# Patient Record
Sex: MALE | Race: WHITE | NOT HISPANIC OR LATINO | URBAN - METROPOLITAN AREA
[De-identification: names, ages, dates, MRNs, and addresses within clinical notes are randomized per-mention and may not be internally consistent; named-entity substitution may affect disease eponyms.]

---
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
## 2021-01-08 ENCOUNTER — OFFICE VISIT - HEALTHEAST (OUTPATIENT)
Dept: FAMILY MEDICINE | Facility: CLINIC | Age: 21
End: 2021-01-08

## 2021-01-08 DIAGNOSIS — R05.9 COUGH: ICD-10-CM

## 2021-01-08 DIAGNOSIS — Z20.822 SUSPECTED COVID-19 VIRUS INFECTION: ICD-10-CM

## 2021-01-08 DIAGNOSIS — J02.9 SORE THROAT: ICD-10-CM

## 2021-01-08 DIAGNOSIS — R53.83 FATIGUE, UNSPECIFIED TYPE: ICD-10-CM

## 2021-01-08 DIAGNOSIS — R43.2 TASTE SENSE ALTERED: ICD-10-CM

## 2021-05-31 VITALS — BODY MASS INDEX: 28.32 KG/M2 | HEIGHT: 65 IN | WEIGHT: 170 LBS

## 2021-06-13 NOTE — PROGRESS NOTES
"11-18 YEAR WELL CHILD CHECK    Height:  5' 4.5\" (1.638 m)  Weight: 170 lb (77.1 kg)  Blood Pressure: 114/72  BMI: Body mass index is 28.73 kg/(m^2).  BSA: Body surface area is 1.87 meters squared.    SUBJECTIVE  This very pleasant 17-year-old male who is a senior at [employer].  He is considering going into the .  He is doing well in school.  He is the oldest of 2 children.  He plays baseball, has worked an outside job in the past, and his mother has no concerns.  Concerns: None, child doing well.     Family History   Problem Relation Age of Onset     Gestational diabetes Mother      No Medical Problems Father      Asthma Brother      Allergies Brother          Is child seen by dentist?     Yes, regular visits with family dentist    Nutrition:  No concerns    REVIEW OF SYSTEMS  Constitutional: Negative.  Negative for fever, activity change, appetite change and irritability.   HENT: Negative.  Negative for congestion, ear pain and voice change.    Eyes: Negative.  Negative for discharge and redness.   Respiratory: Negative.  Negative for apnea, choking and wheezing.    Cardiovascular: Negative.  Negative for cyanosis.   Gastrointestinal: Negative.  Negative for diarrhea, constipation, blood in stool and abdominal distention.   Endocrine: Negative.    Genitourinary: Negative.  Negative for decreased urine volume.   Musculoskeletal: Negative.  Negative for gait problem.   Skin: Negative.  Negative for color change and rash.   Allergic/Immunologic: Negative.  Negative for environmental allergies and food allergies.   Neurological: Negative.  Negative for seizures, facial asymmetry and weakness.   Hematological: Negative.  Does not bruise/bleed easily.   Psychiatric/Behavioral: Negative.  Negative for behavioral problems. The patient is not hyperactive.        PHYSICAL EXAM  General Appearance:   Alert, NAD   Eyes: Clear  Ears:  TM's pearly grey  Nose: Clear   Throat:  Clear   Neck:   Supple, no " significant adenopathy  Lungs:  Clear with equal air entry, no retractions or increased work of breathing  Cardiac: RRR without murmur  Abdomen:   Soft, nontender, no hepatosplenomegaly or mass palpable  Genitourinary: Deferred  Musculoskeletal:  Normal   Skin:  No rash or jaundice    ANTICIPATORY GUIDANCE    I have discussed age appropriate anticipatory guidance with the patient and all questions answered.  No concerns voiced.    ASSESSMENT/PLAN    Healthy 17-year-old.  Immunizations updated today.  We discussed importance of getting good grades and doing well in school, we discussed his future endeavors after high school including possible  service.  Encouraged continued participation in baseball.  All questions answered.        --    Garrett Brown M.D.

## 2021-06-14 NOTE — PROGRESS NOTES
"Heath Quevedo is a 20 y.o. male who is being evaluated via a billable telephone visit.      What phone number would you like to be contacted at? [phone].  How would you like to obtain your AVS? N/A.   Assessment & Plan       Overall patient is doing okay.  He and mother asked if he should be tested for COVID-19.  I said that given his symptoms it certainly would be a good idea to know if he has it.  Patient then started to have doubts about getting tested for Covid.  When asked why he says that he does not want to be \"a part of that\" and cannot get into anything more specific.  When asked what I can do today to help him or what he is looking for, it sounds like he just wants to know if it is my recommendation he get tested which it is.  They expressed understanding and thanked me for my time.      Taste sense altered    Fatigue, unspecified type    Sore throat    Cough    Suspected COVID-19 virus infection        9 minutes spent on the date of the encounter doing chart review, patient visit and discussion with family        No follow-ups on file.    Omid Badillo CNP  [facility]     Heath Quevedo is 20 y.o. and presents to clinic today for the following health issues   HPI     New patient presents today with the assistance of his mother with concerns for illness including sore throat, mild cough, fatigue, chills, body aches.  Sick exposure include others in his family.  Patient says that he does not ever really leave the house so he does not think that he could have gotten this from anybody else.  No severe cough or shortness of breath.  No rash.        Review of Systems  Positive for: Mild cough, fatigue, sore throat, change in taste.      Objective       Vitals:  No vitals were obtained today due to virtual visit.    Physical Exam  Unable to complete due to this being a phone visit        Phone call duration: 7 minutes    Omid Badillo CNP    "

## 2021-12-06 ENCOUNTER — NURSE TRIAGE (OUTPATIENT)
Dept: NURSING | Facility: CLINIC | Age: 21
End: 2021-12-06
Payer: COMMERCIAL

## 2021-12-06 NOTE — TELEPHONE ENCOUNTER
"Patient calling  He is reporting the following symptoms;  Achy body  Slight headache  Cough slight  Not feeling well.for about 1 week.    Asking where to be tested.for COVIDCOVID 19 Nurse Triage Plan/Patient Instructions    Please be aware that novel coronavirus (COVID-19) may be circulating in the community. If you develop symptoms such as fever, cough, or SOB or if you have concerns about the presence of another infection including coronavirus (COVID-19), please contact your health care provider or visit [email or web address].     Disposition/Instructions    Home care recommended. Follow home care protocol based instructions.  Virtual Visit with provider recommended. Reference Visit Selection Guide.  Additional COVID19 information to add for patients.   How can I protect others?  If you have symptoms (fever, cough, body aches or trouble breathing): Stay home and away from others (self-isolate) until:    At least 10 days have passed since your symptoms started, And     You ve had no fever--and no medicine that reduces fever--for 1 full day (24 hours), And      Your other symptoms have resolved (gotten better).     If you don t have symptoms, but a test showed that you have COVID-19 (you tested positive):    Stay home and away from others (self-isolate). Follow the tips under \"How do I self-isolate?\" below for 10 days (20 days if you have a weak immune system).    You don't need to be retested for COVID-19 before going back to school or work. As long as you're fever-free and feeling better, you can go back to school, work and other activities after waiting the 10 or 20 days.     How do I self-isolate?    Stay in your own room, even for meals. Use your own bathroom if you can.     Stay away from others in your home. No hugging, kissing or shaking hands. No visitors.    Don t go to work, school or anywhere else.     Clean  high touch  surfaces often (doorknobs, counters, handles, etc.). Use a household cleaning " spray or wipes. You ll find a full list on the EPA website:  www.epa.gov/pesticide-registration/list-n-disinfectants-use-against-sars-cov-2.    Cover your mouth and nose with a mask, tissue or washcloth to avoid spreading germs.    Wash your hands and face often. Use soap and water.    Caregivers in these groups are at risk for severe illness due to COVID-19:  o People 65 years and older  o People who live in a nursing home or long-term care facility  o People with chronic disease (lung, heart, cancer, diabetes, kidney, liver, immunologic)  o People who have a weakened immune system, including those who:  - Are in cancer treatment  - Take medicine that weakens the immune system, such as corticosteroids  - Had a bone marrow or organ transplant  - Have an immune deficiency  - Have poorly controlled HIV or AIDS  - Are obese (body mass index of 40 or higher)  - Smoke regularly    Caregivers should wear gloves while washing dishes, handling laundry and cleaning bedrooms and bathrooms.    Use caution when washing and drying laundry: Don t shake dirty laundry, and use the warmest water setting that you can.    For more tips, go to www.cdc.gov/coronavirus/2019-ncov/downloads/10Things.pdf.    How can I take care of myself?  1. Get lots of rest. Drink extra fluids (unless a doctor has told you not to).     2. Take Tylenol (acetaminophen) for fever or pain. If you have liver or kidney problems, ask your family doctor if it s okay to take Tylenol.     Adults can take either:     650 mg (two 325 mg pills) every 4 to 6 hours, or     1,000 mg (two 500 mg pills) every 8 hours as needed.     Note: Don t take more than 3,000 mg in one day.   Acetaminophen is found in many medicines (both prescribed and over-the-counter medicines). Read all labels to be sure you don t take too much.     For children, check the Tylenol bottle for the right dose. The dose is based on the child s age or weight.    3. If you have other health problems  (like cancer, heart failure, an organ transplant or severe kidney disease): Call your specialty clinic if you don t feel better in the next 2 days.    4. Know when to call 911: Emergency warning signs include:    Trouble breathing or shortness of breath    Pain or pressure in the chest that doesn t go away    Feeling confused like you haven t felt before, or not being able to wake up    Bluish-colored lips or face    What are the symptoms of COVID-19?     The most common symptoms are cough, fever and trouble breathing.     Less common symptoms include body aches, chills, diarrhea (loose, watery poops), fatigue (feeling very tired), headache, runny nose, sore throat and loss of smell.    COVID-19 can cause severe coughing (bronchitis) and lung infection (pneumonia).    How does it spread?     The virus may spread when a person coughs or sneezes into the air. The virus can travel about 6 feet this way, and it can live on surfaces.      Common  (household disinfectants) will kill the virus.    Who is at risk?  Anyone can catch COVID-19 if they re around someone who has the virus.    How can others protect themselves?     Stay away from people who have COVID-19 (or symptoms of COVID-19).    Wash hands often with soap and water. Or, use hand  with at least 60% alcohol.    Avoid touching the eyes, nose or mouth.     Wear a face mask when you go out in public, when sick or when caring for a sick person.    Where can I get more information?    [facility]: About COVID-19: [email or web address]    CDC: What to Do If You re Sick: www.cdc.gov/coronavirus/2019-ncov/about/steps-when-sick.html    CDC: Ending Home Isolation: www.cdc.gov/coronavirus/2019-ncov/hcp/disposition-in-home-patients.html     CDC: Caring for Someone: www.cdc.gov/coronavirus/2019-ncov/if-you-are-sick/care-for-someone.html     MD: Interim Guidance for Hospital Discharge to Home:  www.[facility]./diseases/coronavirus/hcp/hospdischarge.pdf    [facility] clinical trials (COVID-19 research studies): clinicalaffairs.[facility]-clinical-trials     Below are the COVID-19 hotlines at the [place] [facility] ([facility]). Interpreters are available.   o For health questions: Call 325-478-8894 or 1-729.869.5807 (7 a.m. to 7 p.m.)  o For questions about schools and childcare: Call 243-231-7618 or 1-370.254.9376 (7 a.m. to 7 p.m.)          Thank you for taking steps to prevent the spread of this virus.  o Limit your contact with others.  o Wear a simple mask to cover your cough.  o Wash your hands well and often.    [facility]: About COVID-19: [email or web address]    CDC: What to Do If You're Sick: www.cdc.gov/coronavirus/2019-ncov/about/steps-when-sick.html    CDC: Ending Home Isolation: www.cdc.gov/coronavirus/2019-ncov/hcp/disposition-in-home-patients.html     CDC: Caring for Someone: www.cdc.gov/coronavirus/2019-ncov/if-you-are-sick/care-for-someone.html     [facility]: Interim Guidance for Hospital Discharge to Home: www.[facility]./diseases/coronavirus/hcp/hospdischarge.pdf    [facility] clinical trials (COVID-19 research studies): clinicalaffairs.[facility]-clinical-trials     Below are the COVID-19 hotlines at the [place] [facility] ([facility]). Interpreters are available.   o For health questions: Call 525-254-2613 or 1-998.831.5815 (7 a.m. to 7 p.m.)  o For questions about schools and childcare: Call 818-661-1028 or 1-922.885.4571 (7 a.m. to 7 p.m.)                     Advised to get tested in his community, and also advised to make E-visit with [facility].    Asiya B. Ablin, RN  [facility] Triage/refill nurse        Reason for Disposition    COVID-19 Testing, questions about    COVID-19 Prevention and Healthy Living, questions about    Additional Information    Negative: COVID-19 Home Isolation, questions about     Negative: [1] COVID-19 diagnosed AND [2] has mild nausea, vomiting or diarrhea    Negative: [1] COVID-19 diagnosed by doctor (or NP/PA) AND [2] mild symptoms (e.g., cough, fever, others) AND [3] no complications or SOB    Negative: [1] COVID-19 diagnosed by positive lab test AND [2] mild symptoms (e.g., cough, fever, others) AND [3] no complications or SOB    Negative: [1] COVID-19 diagnosed by positive lab test AND [2] NO symptoms (e.g., cough, fever, others)    Negative: Cough present > 3 weeks    Negative: [1] COVID-19 infection suspected by caller or triager AND [2] mild symptoms (cough, fever, or others) AND [3] negative COVID-19 rapid test    Negative: Fever present > 3 days (72 hours)    Negative: [1] Fever returns after gone for over 24 hours AND [2] symptoms worse or not improved    Negative: [1] Continuous (nonstop) coughing interferes with work or school AND [2] no improvement using cough treatment per protocol    Negative: HIGH RISK for severe COVID complications (e.g., age > 64 years, obesity with BMI > 25, pregnant, chronic lung disease or other chronic medical condition)  (Exception: Already seen by PCP and no new or worsening symptoms.)    Negative: [1] HIGH RISK patient AND [2] influenza is widespread in the community AND [3] ONE OR MORE respiratory symptoms: cough, sore throat, runny or stuffy nose    Negative: [1] HIGH RISK patient AND [2] influenza exposure within the last 7 days AND [3] ONE OR MORE respiratory symptoms: cough, sore throat, runny or stuffy nose    Negative: Fever > 103 F (39.4 C)    Negative: [1] Fever > 101 F (38.3 C) AND [2] age > 60 years    Negative: [1] Fever > 100.0 F (37.8 C) AND [2] bedridden (e.g., nursing home patient, CVA, chronic illness, recovering from surgery)    Negative: MILD difficulty breathing (e.g., minimal/no SOB at rest, SOB with walking, pulse <100)    Negative: Chest pain or pressure    Negative: Patient sounds very sick or weak to the triager     Negative: SEVERE or constant chest pain or pressure (Exception: mild central chest pain, present only when coughing)    Negative: MODERATE difficulty breathing (e.g., speaks in phrases, SOB even at rest, pulse 100-120)    Negative: [1] Headache AND [2] stiff neck (can't touch chin to chest)    Negative: [1] COVID-19 exposure AND [2] no symptoms    Negative: COVID-19 vaccine reaction suspected (e.g., fever, headache, muscle aches) occurring 1 to 3 days after getting vaccine    Negative: COVID-19 vaccine, questions about    Negative: [1] Lives with someone known to have influenza (flu test positive) AND [2] flu-like symptoms (e.g., cough, runny nose, sore throat, SOB; with or without fever)    Negative: [1] Adult with possible COVID-19 symptoms AND [2] triager concerned about severity of symptoms or other causes    Negative: COVID-19 and breastfeeding, questions about    Negative: SEVERE difficulty breathing (e.g., struggling for each breath, speaks in single words)    Negative: Difficult to awaken or acting confused (e.g., disoriented, slurred speech)    Negative: Bluish (or gray) lips or face now    Negative: Shock suspected (e.g., cold/pale/clammy skin, too weak to stand, low BP, rapid pulse)    Negative: Sounds like a life-threatening emergency to the triager    Protocols used: CORONAVIRUS (COVID-19) DIAGNOSED OR EXDOVQUVR-D-MW 8.25.2021

## 2021-12-07 ENCOUNTER — VIRTUAL VISIT (OUTPATIENT)
Dept: FAMILY MEDICINE | Facility: CLINIC | Age: 21
End: 2021-12-07
Payer: COMMERCIAL

## 2021-12-07 DIAGNOSIS — R05.9 COUGH: Primary | ICD-10-CM

## 2021-12-07 PROCEDURE — 99213 OFFICE O/P EST LOW 20 MIN: CPT | Mod: 95 | Performed by: PEDIATRICS

## 2021-12-07 NOTE — PROGRESS NOTES
Heath is a 21 year old who is being evaluated via a billable telephone visit.      What phone number would you like to be contacted at?   How would you like to obtain your AVS? Mail a copy    Assessment & Plan     Cough  Education and supportive cares discussed  Warning signs and reasons to return discussed  - Symptomatic COVID-19 Virus (Coronavirus) by PCR                   Return in about 5 days (around 12/12/2021) for if not improving.    Olga Sue MD  [facility] [place] [facility]    Kelle Joe is a 21 year old who presents for the following health issues     HPI     Acute Illness  Acute illness concerns: COVID symptoms  Onset/Duration: 9 days, improving over past 2 days  Symptoms:  Fever: unsure, not measured  Chills/Sweats: YES  Headache (location?): YES  Sinus Pressure: no  Conjunctivitis:  no  Ear Pain: no  Rhinorrhea: YES  Congestion: no  Sore Throat: no  Cough: YES - last week, slight  Wheeze: no  Decreased Appetite: no  Nausea: no  Vomiting: no  Diarrhea: no  Dysuria/Freq.: no  Dysuria or Hematuria: no  Fatigue/Achiness: YES  Sick/Strep Exposure: no  Therapies tried and outcome: Tylenol helps      Review of Systems   Constitutional, HEENT, cardiovascular, pulmonary, gi and gu systems are negative, except as otherwise noted.      Objective           Vitals:  No vitals were obtained today due to virtual visit.    Physical Exam   healthy, alert and no distress  PSYCH: Alert and oriented times 3; coherent speech, normal   rate and volume, able to articulate logical thoughts, able   to abstract reason, no tangential thoughts, no hallucinations   or delusions  His affect is normal  RESP: No cough, no audible wheezing, able to talk in full sentences  Remainder of exam unable to be completed due to telephone visits                Phone call duration: 5 minutes

## 2021-12-08 ENCOUNTER — LAB (OUTPATIENT)
Dept: URGENT CARE | Facility: URGENT CARE | Age: 21
End: 2021-12-08
Attending: PEDIATRICS
Payer: COMMERCIAL

## 2021-12-08 DIAGNOSIS — R05.9 COUGH: ICD-10-CM

## 2021-12-08 LAB — SARS-COV-2 RNA RESP QL NAA+PROBE: NEGATIVE

## 2021-12-08 PROCEDURE — U0003 INFECTIOUS AGENT DETECTION BY NUCLEIC ACID (DNA OR RNA); SEVERE ACUTE RESPIRATORY SYNDROME CORONAVIRUS 2 (SARS-COV-2) (CORONAVIRUS DISEASE [COVID-19]), AMPLIFIED PROBE TECHNIQUE, MAKING USE OF HIGH THROUGHPUT TECHNOLOGIES AS DESCRIBED BY CMS-2020-01-R: HCPCS

## 2021-12-08 PROCEDURE — U0005 INFEC AGEN DETEC AMPLI PROBE: HCPCS

## 2021-12-08 NOTE — LETTER
December 9, 2021      Heath Christopheron  [address]   [place] [address]        Dear ,    We are writing to inform you of your test results.    Your COVID test is negative.  Please let me know if your cough is not improving.     Please don't hesitate to call me if you have any questions.     Resulted Orders   Symptomatic COVID-19 Virus (Coronavirus) by PCR Nose   Result Value Ref Range    SARS CoV2 PCR Negative Negative, Testing sent to reference lab. Results will be returned via unsolicited result      Comment:      NEGATIVE: SARS-CoV-2 (COVID-19) RNA not detected, presumed negative.    Narrative    Testing was performed using the [employer]pt[employer] SARS-CoV-2 Assay on the  [employer] Instrument System. Additional information about this  Emergency Use Authorization (EUA) assay can be found via the Lab  Guide. This test should be ordered for the detection of SARS-CoV-2 in  individuals who meet SARS-CoV-2 clinical and/or epidemiological  criteria. Test performance is unknown in asymptomatic patients. This  test is for in vitro diagnostic use under the FDA EUA for  laboratories certified under CLIA to perform high complexity testing.  This test has not been FDA cleared or approved. A negative result  does not rule out the presence of PCR inhibitors in the specimen or  target RNA in concentration below the limit of detection for the  assay. The possibility of a false negative should be considered if  the patient's recent exposure or clinical presentation suggests  COVID-19. This test was validated by the [facility]. This laboratory is certified under  the Clinical Laboratory Improvement Amendments of 1988 (CLIA-88) as  qualified to perform high complexity laboratory testing.       If you have any questions or concerns, please call the clinic at the number listed above.       Sincerely,      Olga Sue MD

## 2021-12-09 NOTE — RESULT ENCOUNTER NOTE
Dear Heath Quevedo,    Your COVID test is negative.  Please let me know if your cough is not improving.    Please don't hesitate to call me if you have any questions.    Sincerely,  Olga Sue M.D.  [phone]

## 2024-07-16 ENCOUNTER — APPOINTMENT (OUTPATIENT)
Dept: CT IMAGING | Facility: CLINIC | Age: 24
End: 2024-07-16
Attending: EMERGENCY MEDICINE
Payer: COMMERCIAL

## 2024-07-16 ENCOUNTER — HOSPITAL ENCOUNTER (OUTPATIENT)
Facility: HOSPITAL | Age: 24
Setting detail: OBSERVATION
Discharge: HOME | End: 2024-07-17
Attending: INTERNAL MEDICINE | Admitting: INTERNAL MEDICINE
Payer: COMMERCIAL

## 2024-07-16 ENCOUNTER — HOSPITAL ENCOUNTER (EMERGENCY)
Facility: CLINIC | Age: 24
Discharge: OTHER INSTITUTION | End: 2024-07-16
Attending: EMERGENCY MEDICINE | Admitting: EMERGENCY MEDICINE
Payer: COMMERCIAL

## 2024-07-16 ENCOUNTER — OFFICE VISIT (OUTPATIENT)
Dept: URGENT CARE | Facility: URGENT CARE | Age: 24
End: 2024-07-16
Payer: COMMERCIAL

## 2024-07-16 VITALS
BODY MASS INDEX: 30.82 KG/M2 | TEMPERATURE: 99.6 F | WEIGHT: 185 LBS | HEIGHT: 65 IN | RESPIRATION RATE: 16 BRPM | SYSTOLIC BLOOD PRESSURE: 129 MMHG | OXYGEN SATURATION: 98 % | HEART RATE: 99 BPM | DIASTOLIC BLOOD PRESSURE: 84 MMHG

## 2024-07-16 VITALS
OXYGEN SATURATION: 98 % | RESPIRATION RATE: 21 BRPM | WEIGHT: 186 LBS | SYSTOLIC BLOOD PRESSURE: 119 MMHG | DIASTOLIC BLOOD PRESSURE: 65 MMHG | BODY MASS INDEX: 30.99 KG/M2 | HEIGHT: 65 IN | HEART RATE: 71 BPM | TEMPERATURE: 98.9 F

## 2024-07-16 DIAGNOSIS — K62.89 PROCTITIS: Primary | ICD-10-CM

## 2024-07-16 DIAGNOSIS — M25.551 BILATERAL HIP PAIN: Primary | ICD-10-CM

## 2024-07-16 DIAGNOSIS — K61.2 ABSCESS OF ANAL OR RECTAL REGION: ICD-10-CM

## 2024-07-16 DIAGNOSIS — K62.89 PROCTITIS: ICD-10-CM

## 2024-07-16 DIAGNOSIS — K62.89 RECTAL PAIN: ICD-10-CM

## 2024-07-16 DIAGNOSIS — M25.552 BILATERAL HIP PAIN: Primary | ICD-10-CM

## 2024-07-16 LAB
ALBUMIN SERPL BCG-MCNC: 4.7 G/DL (ref 3.5–5.2)
ALBUMIN UR-MCNC: 100 MG/DL
ALP SERPL-CCNC: 77 U/L (ref 40–150)
ALT SERPL W P-5'-P-CCNC: 11 U/L (ref 0–70)
ANION GAP SERPL CALCULATED.3IONS-SCNC: 15 MMOL/L (ref 7–15)
APPEARANCE UR: CLEAR
AST SERPL W P-5'-P-CCNC: 30 U/L (ref 0–45)
BACTERIA #/AREA URNS HPF: ABNORMAL /HPF
BASE EXCESS BLDV CALC-SCNC: 0.6 MMOL/L (ref -3–3)
BASOPHILS # BLD AUTO: 0 10E3/UL (ref 0–0.2)
BASOPHILS NFR BLD AUTO: 0 %
BILIRUB SERPL-MCNC: 0.5 MG/DL
BILIRUB UR QL STRIP: ABNORMAL
BUN SERPL-MCNC: 10 MG/DL (ref 6–20)
CALCIUM SERPL-MCNC: 9.2 MG/DL (ref 8.8–10.4)
CHLORIDE SERPL-SCNC: 97 MMOL/L (ref 98–107)
COLOR UR AUTO: YELLOW
CREAT SERPL-MCNC: 1.03 MG/DL (ref 0.67–1.17)
CRP SERPL-MCNC: 85 MG/L
EGFRCR SERPLBLD CKD-EPI 2021: >90 ML/MIN/1.73M2
EOSINOPHIL # BLD AUTO: 0 10E3/UL (ref 0–0.7)
EOSINOPHIL NFR BLD AUTO: 0 %
ERYTHROCYTE [DISTWIDTH] IN BLOOD BY AUTOMATED COUNT: 13.1 % (ref 10–15)
ERYTHROCYTE [SEDIMENTATION RATE] IN BLOOD BY WESTERGREN METHOD: 14 MM/HR (ref 0–15)
GLUCOSE SERPL-MCNC: 101 MG/DL (ref 70–99)
GLUCOSE UR STRIP-MCNC: NEGATIVE MG/DL
HCO3 BLDV-SCNC: 26 MMOL/L (ref 21–28)
HCO3 SERPL-SCNC: 23 MMOL/L (ref 22–29)
HCT VFR BLD AUTO: 45.7 % (ref 40–53)
HGB BLD-MCNC: 15.6 G/DL (ref 13.3–17.7)
HGB UR QL STRIP: ABNORMAL
HOLD SPECIMEN: NORMAL
IMM GRANULOCYTES # BLD: 0 10E3/UL
IMM GRANULOCYTES NFR BLD: 0 %
KETONES UR STRIP-MCNC: NEGATIVE MG/DL
LACTATE SERPL-SCNC: 1.1 MMOL/L (ref 0.7–2)
LEUKOCYTE ESTERASE UR QL STRIP: NEGATIVE
LYMPHOCYTES # BLD AUTO: 1.1 10E3/UL (ref 0.8–5.3)
LYMPHOCYTES NFR BLD AUTO: 14 %
MCH RBC QN AUTO: 30.8 PG (ref 26.5–33)
MCHC RBC AUTO-ENTMCNC: 34.1 G/DL (ref 31.5–36.5)
MCV RBC AUTO: 90 FL (ref 78–100)
MONOCYTES # BLD AUTO: 0.7 10E3/UL (ref 0–1.3)
MONOCYTES NFR BLD AUTO: 9 %
MUCOUS THREADS #/AREA URNS LPF: PRESENT /LPF
NEUTROPHILS # BLD AUTO: 5.6 10E3/UL (ref 1.6–8.3)
NEUTROPHILS NFR BLD AUTO: 76 %
NITRATE UR QL: NEGATIVE
NRBC # BLD AUTO: 0 10E3/UL
NRBC BLD AUTO-RTO: 0 /100
O2/TOTAL GAS SETTING VFR VENT: 21 %
OXYHGB MFR BLDV: 73 % (ref 70–75)
PCO2 BLDV: 43 MM HG (ref 40–50)
PH BLDV: 7.38 [PH] (ref 7.32–7.43)
PH UR STRIP: 6 [PH] (ref 5–7)
PLATELET # BLD AUTO: 199 10E3/UL (ref 150–450)
PO2 BLDV: 38 MM HG (ref 25–47)
POTASSIUM SERPL-SCNC: 3.9 MMOL/L (ref 3.4–5.3)
PROT SERPL-MCNC: 8.3 G/DL (ref 6.4–8.3)
RBC # BLD AUTO: 5.06 10E6/UL (ref 4.4–5.9)
RBC #/AREA URNS AUTO: ABNORMAL /HPF
SAO2 % BLDV: 74.4 % (ref 70–75)
SODIUM SERPL-SCNC: 135 MMOL/L (ref 135–145)
SP GR UR STRIP: >=1.03 (ref 1–1.03)
SQUAMOUS #/AREA URNS AUTO: ABNORMAL /LPF
UROBILINOGEN UR STRIP-ACNC: 1 E.U./DL
WBC # BLD AUTO: 7.4 10E3/UL (ref 4–11)
WBC #/AREA URNS AUTO: ABNORMAL /HPF

## 2024-07-16 PROCEDURE — 36415 COLL VENOUS BLD VENIPUNCTURE: CPT | Performed by: EMERGENCY MEDICINE

## 2024-07-16 PROCEDURE — 250N000011 HC RX IP 250 OP 636: Performed by: EMERGENCY MEDICINE

## 2024-07-16 PROCEDURE — 80053 COMPREHEN METABOLIC PANEL: CPT | Performed by: EMERGENCY MEDICINE

## 2024-07-16 PROCEDURE — 85652 RBC SED RATE AUTOMATED: CPT | Performed by: EMERGENCY MEDICINE

## 2024-07-16 PROCEDURE — 82805 BLOOD GASES W/O2 SATURATION: CPT | Performed by: EMERGENCY MEDICINE

## 2024-07-16 PROCEDURE — 258N000003 HC RX IP 258 OP 636: Performed by: EMERGENCY MEDICINE

## 2024-07-16 PROCEDURE — 250N000013 HC RX MED GY IP 250 OP 250 PS 637: Performed by: EMERGENCY MEDICINE

## 2024-07-16 PROCEDURE — 96366 THER/PROPH/DIAG IV INF ADDON: CPT

## 2024-07-16 PROCEDURE — 74177 CT ABD & PELVIS W/CONTRAST: CPT

## 2024-07-16 PROCEDURE — 81001 URINALYSIS AUTO W/SCOPE: CPT | Performed by: NURSE PRACTITIONER

## 2024-07-16 PROCEDURE — 96374 THER/PROPH/DIAG INJ IV PUSH: CPT | Mod: 59

## 2024-07-16 PROCEDURE — 87040 BLOOD CULTURE FOR BACTERIA: CPT | Performed by: EMERGENCY MEDICINE

## 2024-07-16 PROCEDURE — 96365 THER/PROPH/DIAG IV INF INIT: CPT

## 2024-07-16 PROCEDURE — 99207 PR INPT ADMISSION FROM CLINIC: CPT | Performed by: NURSE PRACTITIONER

## 2024-07-16 PROCEDURE — 120N000001 HC R&B MED SURG/OB

## 2024-07-16 PROCEDURE — 83605 ASSAY OF LACTIC ACID: CPT | Performed by: EMERGENCY MEDICINE

## 2024-07-16 PROCEDURE — 250N000011 HC RX IP 250 OP 636: Performed by: INTERNAL MEDICINE

## 2024-07-16 PROCEDURE — 258N000003 HC RX IP 258 OP 636: Performed by: INTERNAL MEDICINE

## 2024-07-16 PROCEDURE — 96361 HYDRATE IV INFUSION ADD-ON: CPT

## 2024-07-16 PROCEDURE — 86140 C-REACTIVE PROTEIN: CPT | Performed by: EMERGENCY MEDICINE

## 2024-07-16 PROCEDURE — 85025 COMPLETE CBC W/AUTO DIFF WBC: CPT | Performed by: EMERGENCY MEDICINE

## 2024-07-16 PROCEDURE — 99285 EMERGENCY DEPT VISIT HI MDM: CPT | Mod: 25

## 2024-07-16 PROCEDURE — 96368 THER/DIAG CONCURRENT INF: CPT

## 2024-07-16 PROCEDURE — 99222 1ST HOSP IP/OBS MODERATE 55: CPT | Performed by: INTERNAL MEDICINE

## 2024-07-16 PROCEDURE — 96375 TX/PRO/DX INJ NEW DRUG ADDON: CPT

## 2024-07-16 PROCEDURE — 96367 TX/PROPH/DG ADDL SEQ IV INF: CPT

## 2024-07-16 RX ORDER — IOPAMIDOL 755 MG/ML
90 INJECTION, SOLUTION INTRAVASCULAR ONCE
Status: COMPLETED | OUTPATIENT
Start: 2024-07-16 | End: 2024-07-16

## 2024-07-16 RX ORDER — HYDROMORPHONE HYDROCHLORIDE 1 MG/ML
0.3 INJECTION, SOLUTION INTRAMUSCULAR; INTRAVENOUS; SUBCUTANEOUS EVERY 6 HOURS PRN
Status: DISCONTINUED | OUTPATIENT
Start: 2024-07-16 | End: 2024-07-17 | Stop reason: HOSPADM

## 2024-07-16 RX ORDER — ONDANSETRON 4 MG/1
4 TABLET, ORALLY DISINTEGRATING ORAL EVERY 6 HOURS PRN
Status: DISCONTINUED | OUTPATIENT
Start: 2024-07-16 | End: 2024-07-17 | Stop reason: HOSPADM

## 2024-07-16 RX ORDER — PIPERACILLIN SODIUM, TAZOBACTAM SODIUM 3; .375 G/15ML; G/15ML
3.38 INJECTION, POWDER, LYOPHILIZED, FOR SOLUTION INTRAVENOUS EVERY 8 HOURS
Status: DISCONTINUED | OUTPATIENT
Start: 2024-07-16 | End: 2024-07-16

## 2024-07-16 RX ORDER — ACETAMINOPHEN 650 MG/1
650 SUPPOSITORY RECTAL EVERY 4 HOURS PRN
Status: DISCONTINUED | OUTPATIENT
Start: 2024-07-16 | End: 2024-07-17 | Stop reason: HOSPADM

## 2024-07-16 RX ORDER — PIPERACILLIN SODIUM, TAZOBACTAM SODIUM 3; .375 G/15ML; G/15ML
3.38 INJECTION, POWDER, LYOPHILIZED, FOR SOLUTION INTRAVENOUS EVERY 8 HOURS
Status: DISCONTINUED | OUTPATIENT
Start: 2024-07-17 | End: 2024-07-17 | Stop reason: HOSPADM

## 2024-07-16 RX ORDER — NALOXONE HYDROCHLORIDE 0.4 MG/ML
0.4 INJECTION, SOLUTION INTRAMUSCULAR; INTRAVENOUS; SUBCUTANEOUS
Status: DISCONTINUED | OUTPATIENT
Start: 2024-07-16 | End: 2024-07-17 | Stop reason: HOSPADM

## 2024-07-16 RX ORDER — METRONIDAZOLE 500 MG/100ML
500 INJECTION, SOLUTION INTRAVENOUS ONCE
Status: DISCONTINUED | OUTPATIENT
Start: 2024-07-16 | End: 2024-07-16 | Stop reason: HOSPADM

## 2024-07-16 RX ORDER — NALOXONE HYDROCHLORIDE 0.4 MG/ML
0.2 INJECTION, SOLUTION INTRAMUSCULAR; INTRAVENOUS; SUBCUTANEOUS
Status: DISCONTINUED | OUTPATIENT
Start: 2024-07-16 | End: 2024-07-17 | Stop reason: HOSPADM

## 2024-07-16 RX ORDER — AMOXICILLIN 250 MG
1 CAPSULE ORAL 2 TIMES DAILY PRN
Status: DISCONTINUED | OUTPATIENT
Start: 2024-07-16 | End: 2024-07-17 | Stop reason: HOSPADM

## 2024-07-16 RX ORDER — CALCIUM CARBONATE 500 MG/1
1000 TABLET, CHEWABLE ORAL 4 TIMES DAILY PRN
Status: DISCONTINUED | OUTPATIENT
Start: 2024-07-16 | End: 2024-07-17 | Stop reason: HOSPADM

## 2024-07-16 RX ORDER — LIDOCAINE 40 MG/G
CREAM TOPICAL
Status: DISCONTINUED | OUTPATIENT
Start: 2024-07-16 | End: 2024-07-17 | Stop reason: HOSPADM

## 2024-07-16 RX ORDER — ACETAMINOPHEN 325 MG/1
650 TABLET ORAL EVERY 4 HOURS PRN
Status: DISCONTINUED | OUTPATIENT
Start: 2024-07-16 | End: 2024-07-17 | Stop reason: HOSPADM

## 2024-07-16 RX ORDER — PIPERACILLIN SODIUM, TAZOBACTAM SODIUM 3; .375 G/15ML; G/15ML
3.38 INJECTION, POWDER, LYOPHILIZED, FOR SOLUTION INTRAVENOUS ONCE
Status: DISCONTINUED | OUTPATIENT
Start: 2024-07-16 | End: 2024-07-16

## 2024-07-16 RX ORDER — CIPROFLOXACIN 2 MG/ML
400 INJECTION, SOLUTION INTRAVENOUS EVERY 12 HOURS
Status: DISCONTINUED | OUTPATIENT
Start: 2024-07-17 | End: 2024-07-17 | Stop reason: HOSPADM

## 2024-07-16 RX ORDER — AMOXICILLIN 250 MG
2 CAPSULE ORAL 2 TIMES DAILY PRN
Status: DISCONTINUED | OUTPATIENT
Start: 2024-07-16 | End: 2024-07-17 | Stop reason: HOSPADM

## 2024-07-16 RX ORDER — SODIUM CHLORIDE 9 MG/ML
INJECTION, SOLUTION INTRAVENOUS CONTINUOUS
Status: DISCONTINUED | OUTPATIENT
Start: 2024-07-16 | End: 2024-07-17

## 2024-07-16 RX ORDER — METRONIDAZOLE 500 MG/100ML
500 INJECTION, SOLUTION INTRAVENOUS EVERY 12 HOURS
Status: DISCONTINUED | OUTPATIENT
Start: 2024-07-17 | End: 2024-07-17 | Stop reason: HOSPADM

## 2024-07-16 RX ORDER — ONDANSETRON 2 MG/ML
4 INJECTION INTRAMUSCULAR; INTRAVENOUS EVERY 6 HOURS PRN
Status: DISCONTINUED | OUTPATIENT
Start: 2024-07-16 | End: 2024-07-17 | Stop reason: HOSPADM

## 2024-07-16 RX ORDER — CIPROFLOXACIN 2 MG/ML
400 INJECTION, SOLUTION INTRAVENOUS ONCE
Status: COMPLETED | OUTPATIENT
Start: 2024-07-16 | End: 2024-07-16

## 2024-07-16 RX ORDER — IBUPROFEN 600 MG/1
600 TABLET, FILM COATED ORAL ONCE
Status: COMPLETED | OUTPATIENT
Start: 2024-07-16 | End: 2024-07-16

## 2024-07-16 RX ADMIN — IOPAMIDOL 90 ML: 755 INJECTION, SOLUTION INTRAVENOUS at 15:47

## 2024-07-16 RX ADMIN — SODIUM CHLORIDE: 9 INJECTION, SOLUTION INTRAVENOUS at 21:55

## 2024-07-16 RX ADMIN — METRONIDAZOLE 500 MG: 500 INJECTION, SOLUTION INTRAVENOUS at 19:25

## 2024-07-16 RX ADMIN — PANTOPRAZOLE SODIUM 40 MG: 40 INJECTION, POWDER, FOR SOLUTION INTRAVENOUS at 21:03

## 2024-07-16 RX ADMIN — CIPROFLOXACIN 400 MG: 400 INJECTION, SOLUTION INTRAVENOUS at 17:32

## 2024-07-16 RX ADMIN — SODIUM CHLORIDE, POTASSIUM CHLORIDE, SODIUM LACTATE AND CALCIUM CHLORIDE 2532 ML: 600; 310; 30; 20 INJECTION, SOLUTION INTRAVENOUS at 14:57

## 2024-07-16 RX ADMIN — IBUPROFEN 600 MG: 600 TABLET ORAL at 15:30

## 2024-07-16 ASSESSMENT — ENCOUNTER SYMPTOMS
RECTAL PAIN: 1
FEVER: 1
SORE THROAT: 0
HEADACHES: 1

## 2024-07-16 ASSESSMENT — ACTIVITIES OF DAILY LIVING (ADL)
ADLS_ACUITY_SCORE: 35
ADLS_ACUITY_SCORE: 18
WEAR_GLASSES_OR_BLIND: NO
ADLS_ACUITY_SCORE: 35
FALL_HISTORY_WITHIN_LAST_SIX_MONTHS: NO
WALKING_OR_CLIMBING_STAIRS_DIFFICULTY: NO
DOING_ERRANDS_INDEPENDENTLY_DIFFICULTY: NO
DIFFICULTY_EATING/SWALLOWING: NO
DRESSING/BATHING_DIFFICULTY: NO
CONCENTRATING,_REMEMBERING_OR_MAKING_DECISIONS_DIFFICULTY: NO
DIFFICULTY_COMMUNICATING: NO
TOILETING_ISSUES: NO
ADLS_ACUITY_SCORE: 18
ADLS_ACUITY_SCORE: 35
ADLS_ACUITY_SCORE: 35
HEARING_DIFFICULTY_OR_DEAF: NO
CHANGE_IN_FUNCTIONAL_STATUS_SINCE_ONSET_OF_CURRENT_ILLNESS/INJURY: NO
ADLS_ACUITY_SCORE: 33
ADLS_ACUITY_SCORE: 35

## 2024-07-16 ASSESSMENT — COLUMBIA-SUICIDE SEVERITY RATING SCALE - C-SSRS
2. HAVE YOU ACTUALLY HAD ANY THOUGHTS OF KILLING YOURSELF IN THE PAST MONTH?: NO
6. HAVE YOU EVER DONE ANYTHING, STARTED TO DO ANYTHING, OR PREPARED TO DO ANYTHING TO END YOUR LIFE?: NO
1. IN THE PAST MONTH, HAVE YOU WISHED YOU WERE DEAD OR WISHED YOU COULD GO TO SLEEP AND NOT WAKE UP?: NO

## 2024-07-16 NOTE — ED TRIAGE NOTES
Patient has rectal pain since Sunday. Diagnosed with rectal abscess today at urgent care and sent to ED. Took OTC migraine medication this morning 0800, no tylenol or ibuprofen since. Has been febrile since Sunday per patient.      Triage Assessment (Adult)       Row Name 07/16/24 1425          Triage Assessment    Airway WDL WDL        Respiratory WDL    Respiratory WDL WDL        Skin Circulation/Temperature WDL    Skin Circulation/Temperature WDL X  rectal abscess.        Cardiac WDL    Cardiac WDL WDL        Peripheral/Neurovascular WDL    Peripheral Neurovascular WDL WDL        Cognitive/Neuro/Behavioral WDL    Cognitive/Neuro/Behavioral WDL WDL

## 2024-07-16 NOTE — PATIENT INSTRUCTIONS
Triaged to ER for higher level of care given perirectal abscess with rectal pain pus drainage hip pain headache low-grade fever sweats worsening in the last 3 days  He is monogamous with girlfriend  Declines any relevant past medical history  BM today  Urgent care limited without stat labs IV antibiotics advanced imaging colorectal consult obs  Patient will go to local ER now by car, call 911 if worsening

## 2024-07-16 NOTE — LETTER
[facility]  [address] [address]  Phone: [phone]  Fax: [phone]    July 17, 2024        Heath Quevedo  [address] [address]          To whom it may concern:    RE: Heath Quevdeo    Patient was seen and treated 7/16/24 - 7/17/24 at [facility].  Please excuse from work 7/15/24 through 7/19/24.     Please contact me for questions or concerns.      Sincerely,      Rachel Padilla, DO

## 2024-07-16 NOTE — MEDICATION SCRIBE - ADMISSION MEDICATION HISTORY
Medication Scribe Admission Medication History    Admission medication history is complete. The information provided in this note is only as accurate as the sources available at the time of the update.    Information Source(s): Patient and CareEverywhere/SureScripts via in-person    Pertinent Information: NKDA, not taking any Rx or OTC products at this time.     Changes made to PTA medication list:  Added: None  Deleted: None  Changed: None    Allergies reviewed with patient and updates made in EHR: yes    Medication History Completed By: Anupam Willis 7/16/2024 5:09 PM    No outpatient medications have been marked as taking for the 7/16/24 encounter (Hospital Encounter).        Peripheral Block    Patient location during procedure: OR   Block not for primary anesthetic.  Reason for block: at surgeon's request and post-op pain management   Post-op Pain Location: Sternum      Staffing  Authorizing Provider: Ramírez Donald MD  Performing Provider: Ramírez Donald MD    Staffing  Performed by: Ramírez Donald MD  Authorized by: Ramírez Donald MD    Preanesthetic Checklist  Completed: patient identified, IV checked, site marked, risks and benefits discussed, surgical consent, monitors and equipment checked, pre-op evaluation and timeout performed  Peripheral Block  Patient position: supine  Prep: ChloraPrep  Block type: Other (TTP Thoracic Plane)  Laterality: bilateral  Injection technique: single shot  Needle  Needle type: Tuohy   Needle gauge: 20 G  Needle length: 2 in  Needle localization: ultrasound guidance   -ultrasound image captured on disc.  Medications:    Medications: ropivacaine (NAROPIN) injection 0.5% - Perineural   6.85 mL - 3/25/2024 8:15:00 AM    Additional Notes  Ropivacaine 0.5% 6.85 mLs diluted into 20mLs PFNS. Block injected bliaterally under ultrasound guidance

## 2024-07-16 NOTE — ED PROVIDER NOTES
EMERGENCY DEPARTMENT ENCOUNTER      NAME: Heath Quevedo  AGE: 24 year old male  YOB: 2000  MRN: [id number]  EVALUATION DATE & TIME: No admission date for patient encounter.    PCP: No Ref-Primary, Physician    ED PROVIDER: Anthony Gotti MD      Chief Complaint   Patient presents with    Rectal Problem         FINAL IMPRESSION:  1. Proctitis          ED COURSE & MEDICAL DECISION MAKING:    Pertinent Labs & Imaging studies reviewed. (See chart for details)  24 year old male presents to the Emergency Department for evaluation of fever and rectal pain    Clinically well-appearing    ED Course as of 07/16/24 1820 Tue Jul 16, 2024   1433 Patient presents to triage with fever and tachycardia and reportedly has rectal abscess diagnosed from urgent care.  I reviewed urgent care note.  They noted patient had abdominal pain and hip pain but is unclear if the next did a rectal exam.  Unfortunately patient is in lobby secondary to boarding crisis   1433 Concern for sepsis.  Ordered sepsis labs and fluids.   1434 I reviewed urgent care note from [facility] clinic today   1434 Temp(!): 101.4  F (38.6  C)   1434 Pulse: 100   1434 BP(!): 151/72  IV fluids, sepsis labs ordered   1538 Given ibuprofen.  Will obtain CT imaging to look for perianal or perirectal abscess.  Left hip pain also considered septic joint but that does seem less likely especially with mucus and perianal pain   1539 Elevated CRP.  Normal lactic acid.  Normal CBC.  BMP notable for mild increased glucose   1700 I updated the patient.   1707 Left hip full range of motion.  No fluid noted commented on imaging.  Doubt septic hip.   1707 Patient recently travel to [place] 2 weeks ago.  Did eat seafood.  Denies any recent antibiotics.  Denies any anal interoucrse   1709 Differential includes perianal abscess, perirectal abscess, proctitis, colitis, septic joint, diarrhea,   1710 Plan for CRP, CMP, CBC, sed rate, stool testing, CT  imaging   1710 Full range of motion left hip without any significant tenderness.  Doubt septic joint   1710 CT independently reviewed shows inflammation of rectum but no drainable abscess noted.  Will discuss with GI   1714 I spoke to GI. Recommended Cipro and Flagyl treatment until midnight.   1723 Ordered Cipro and Flagyl   1754 I spoke to the hospitalist at [facility].   1754 I updated the patient.   Spoke with Dr. Bhatti [facility] hospitalist who agreed with transfer    Given Cipro and Flagyl.  Plan for transfer to [facility]    Medical Decision Making  Obtained supplemental history:Supplemental history obtained?: No  Reviewed external records: External records reviewed?: No  Care impacted by chronic illness:N/A  Care significantly affected by social determinants of health:Access to Medical Care  Did you consider but not order tests?: Work up considered but not performed and documented in chart, if applicable  Did you interpret images independently?: Independent interpretation of ECG and images noted in documentation, when applicable.  Consultation discussion with other provider:Did you involve another provider (consultant, , pharmacy, etc.)?: I discussed the care with another health care provider, see documentation for details.  Admit.    At the conclusion of the encounter I discussed the results of all of the tests and the disposition. The questions were answered. The patient or family acknowledged understanding and was agreeable with the care plan.         MEDICATIONS GIVEN IN THE EMERGENCY:  Medications   sodium chloride (PF) 0.9% PF flush 3 mL (has no administration in time range)   sodium chloride (PF) 0.9% PF flush 3 mL (has no administration in time range)   lactated ringers BOLUS 2,532 mL (2,532 mLs Intravenous $New Bag 7/16/24 3497)   ibuprofen (ADVIL/MOTRIN) tablet 600 mg (600 mg Oral $Given 7/16/24 1530)   iopamidol (ISOVUE-370) solution 90 mL (90 mLs Intravenous $Given 7/16/24 1547)       NEW  PRESCRIPTIONS STARTED AT TODAY'S ER VISIT  New Prescriptions    No medications on file          =================================================================    HPI    Patient information was obtained from: the patient    Use of : N/A      Heath Quevedo is a 24 year old male with no pertinent history who presents to this ED for evaluation of rectal abscess.    The patient was advised by urgent care to present to the ED after being diagnosed with a rectal abscess. He reports experiencing fevers, migraines, and anal tenderness since Sunday, 07/14/2024. The patient also endorses some pus around his anus and abdominal pain, though he no longer experiencing abdominal pain anymore. The pain is reportedly all around the anus but does not radiate to other regions. The patient additionally endorses left hip pain that began yesterday evening, 07/15/2024, and he experiences pain while walking. He states that he has had a slight runny nose, but this is normal for him. He denies others with similar symptoms, having similar episodes in the past, erythema around his left hip, scrotum pain, and sore throat.    The patient states that he can pass forcefully, but without force, he would not be able to pass normally. 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 He took an over-the-counter migraine medication at 8 AM today, 07/16/2024. There were no other complaints/concerns at this time.      REVIEW OF SYSTEMS   Review of Systems   Constitutional:  Positive for fever.   HENT:  Negative for sore throat.    Gastrointestinal:  Positive for rectal pain.   Musculoskeletal:  Positive for gait problem.   Neurological:  Positive for headaches.        PAST MEDICAL HISTORY:  Past Medical History:   Diagnosis Date    Plantar warts        PAST SURGICAL HISTORY:  History reviewed. No pertinent surgical history.        CURRENT MEDICATIONS:    No current outpatient medications on file.      ALLERGIES:  No Known Allergies    FAMILY  "HISTORY:  Family History   Problem Relation Age of Onset    Gestational Diabetes Mother     No Known Problems Father     Asthma Brother     Allergies Brother        SOCIAL HISTORY:   Social History     Socioeconomic History    Marital status: Single   Tobacco Use    Smoking status: Never    Smokeless tobacco: Never   Vaping Use    Vaping status: Every Day   Substance and Sexual Activity    Alcohol use: No    Drug use: No    Sexual activity: Never     Social Determinants of Health      [facility]    Social Connections       VITALS:  /65   Pulse 84   Temp 98.9  F (37.2  C) (Oral)   Resp 20   Ht 1.651 m (5' 5\")   Wt 84.4 kg (186 lb)   SpO2 98%   BMI 30.95 kg/m      PHYSICAL EXAM      Vitals: /65   Pulse 84   Temp 98.9  F (37.2  C) (Oral)   Resp 20   Ht 1.651 m (5' 5\")   Wt 84.4 kg (186 lb)   SpO2 98%   BMI 30.95 kg/m    General: Appears in no acute distress, awake, alert, interactive. Slight limp with ambulation.  Eyes: Conjunctivae non-injected. Sclera anicteric.  HENT: Atraumatic.  Neck: Supple.  Respiratory/Chest: Respiration unlabored.  Abdomen: non distended. External exam: Mucus and tenderness around anus. No obvious abscess upon palpation.  Musculoskeletal: Normal extremities. No edema or erythema.  Full range of motion of left hip without any significant tenderness or guarding or rigidity  Skin: Normal color. No rash or diaphoresis.  Neurologic: Face symmetric, moves all extremities spontaneously. Speech clear.  Psychiatric: Oriented to person, place, and time. Affect appropriate.    LAB:  All pertinent labs reviewed and interpreted.  Results for orders placed or performed during the hospital encounter of 07/16/24   CT Abdomen Pelvis w Contrast    Impression    IMPRESSION:   Mild inflammation of the rectum suggesting infectious or inflammatory proctitis. No evidence of perianal abscess or fistula. Note that MRI is more sensitive for " detecting fistulas.   Comprehensive metabolic panel   Result Value Ref Range    Sodium 135 135 - 145 mmol/L    Potassium 3.9 3.4 - 5.3 mmol/L    Carbon Dioxide (CO2) 23 22 - 29 mmol/L    Anion Gap 15 7 - 15 mmol/L    Urea Nitrogen 10.0 6.0 - 20.0 mg/dL    Creatinine 1.03 0.67 - 1.17 mg/dL    GFR Estimate >90 >60 mL/min/1.73m2    Calcium 9.2 8.8 - 10.4 mg/dL    Chloride 97 (L) 98 - 107 mmol/L    Glucose 101 (H) 70 - 99 mg/dL    Alkaline Phosphatase 77 40 - 150 U/L    AST 30 0 - 45 U/L    ALT 11 0 - 70 U/L    Protein Total 8.3 6.4 - 8.3 g/dL    Albumin 4.7 3.5 - 5.2 g/dL    Bilirubin Total 0.5 <=1.2 mg/dL   Lactic Acid Whole Blood with 1X Repeat in 2 HR when >2   Result Value Ref Range    Lactic Acid, Initial 1.1 0.7 - 2.0 mmol/L   Blood gas venous   Result Value Ref Range    pH Venous 7.38 7.32 - 7.43    pCO2 Venous 43 40 - 50 mm Hg    pO2 Venous 38 25 - 47 mm Hg    Bicarbonate Venous 26 21 - 28 mmol/L    Base Excess/Deficit Venous 0.6 -3.0 - 3.0 mmol/L    FIO2 21     Oxyhemoglobin Venous 73 70 - 75 %    O2 Sat, Venous 74.4 70.0 - 75.0 %   CBC with platelets and differential   Result Value Ref Range    WBC Count 7.4 4.0 - 11.0 10e3/uL    RBC Count 5.06 4.40 - 5.90 10e6/uL    Hemoglobin 15.6 13.3 - 17.7 g/dL    Hematocrit 45.7 40.0 - 53.0 %    MCV 90 78 - 100 fL    MCH 30.8 26.5 - 33.0 pg    MCHC 34.1 31.5 - 36.5 g/dL    RDW 13.1 10.0 - 15.0 %    Platelet Count 199 150 - 450 10e3/uL    % Neutrophils 76 %    % Lymphocytes 14 %    % Monocytes 9 %    % Eosinophils 0 %    % Basophils 0 %    % Immature Granulocytes 0 %    NRBCs per 100 WBC 0 <1 /100    Absolute Neutrophils 5.6 1.6 - 8.3 10e3/uL    Absolute Lymphocytes 1.1 0.8 - 5.3 10e3/uL    Absolute Monocytes 0.7 0.0 - 1.3 10e3/uL    Absolute Eosinophils 0.0 0.0 - 0.7 10e3/uL    Absolute Basophils 0.0 0.0 - 0.2 10e3/uL    Absolute Immature Granulocytes 0.0 <=0.4 10e3/uL    Absolute NRBCs 0.0 10e3/uL   Extra Blue Top Tube   Result Value Ref Range    Hold Specimen JIC     Result Value Ref Range    CRP Inflammation 85.00 (H) <5.00 mg/L   Erythrocyte sedimentation rate auto   Result Value Ref Range    Erythrocyte Sedimentation Rate 14 0 - 15 mm/hr       RADIOLOGY:  Reviewed all pertinent imaging. Please see official radiology report.  CT Abdomen Pelvis w Contrast   Final Result   IMPRESSION:    Mild inflammation of the rectum suggesting infectious or inflammatory proctitis. No evidence of perianal abscess or fistula. Note that MRI is more sensitive for detecting fistulas.            I, Ramya Teran, am serving as a scribe to document services personally performed by Anthony Gotti MD based on my observation and the provider's statements to me. I, Anthony Gotti MD, attest that Ramya Teran is acting in a scribe capacity, has observed my performance of the services and has documented them in accordance with my direction.    Anthony Gotti MD  [facility] EMERGENCY ROOM  [address] [address]  [phone]      Anthony Gotti MD  07/16/24 3407

## 2024-07-16 NOTE — PROGRESS NOTES
"Chief Complaint   Patient presents with    Migraine     X2 days of migraine     Dizziness     X2 days of dizziness     Back Pain     Lower side back pain, moving around to front waist     Urgent Care    Bowel Problems     Hurts when using bathroom, no constipation      SUBJECTIVE:  Heath Quevedo is a 24 year old male presenting with rectal pain pus drainage hip pain headache low-grade fever sweats worsening in the last 3 days. He is monogamous with girlfriend. Declines any relevant past medical history. BM today.  No urinary symptoms scrotal testicular involvement.    Past Medical History:   Diagnosis Date    Plantar warts      No current outpatient medications on file.     No current facility-administered medications for this visit.     Social History     Tobacco Use    Smoking status: Never    Smokeless tobacco: Never   Substance Use Topics    Alcohol use: No     No Known Allergies    Review of Systems  All systems negative except for those listed above in HPI.    OBJECTIVE:   /84   Pulse 99   Temp 99.6  F (37.6  C) (Oral)   Resp 16   Ht 1.651 m (5' 5\")   Wt 83.9 kg (185 lb)   SpO2 98%   BMI 30.79 kg/m      Physical Exam  Vitals reviewed.   Constitutional:       General: He is not in acute distress.     Appearance: Normal appearance. He is not ill-appearing, toxic-appearing or diaphoretic.   HENT:      Head: Normocephalic and atraumatic.      Nose: Nose normal.      Mouth/Throat:      Mouth: Mucous membranes are moist.      Pharynx: Oropharynx is clear.   Eyes:      Extraocular Movements: Extraocular movements intact.      Conjunctiva/sclera: Conjunctivae normal.      Pupils: Pupils are equal, round, and reactive to light.   Cardiovascular:      Rate and Rhythm: Normal rate.      Pulses: Normal pulses.   Pulmonary:      Effort: Pulmonary effort is normal.   Abdominal:      Tenderness: There is abdominal tenderness (hips buttocks).   Musculoskeletal:         General: Normal range of motion.      " Cervical back: Normal range of motion and neck supple.   Skin:     General: Skin is warm and dry.      Coloration: Skin is not pale.   Neurological:      General: No focal deficit present.      Mental Status: He is alert and oriented to person, place, and time.      Motor: No weakness.      Gait: Gait normal.   Psychiatric:         Mood and Affect: Mood normal.         Behavior: Behavior normal.       ASSESSMENT:    ICD-10-CM    1. Bilateral hip pain  M25.551 UA Macroscopic with reflex to Microscopic and Culture - Clinic Collect    M25.552 UA Microscopic with Reflex to Culture      2. Rectal pain  K62.89       3. Abscess of anal or rectal region  K61.2         PLAN:     Triaged to ER for higher level of care given perirectal abscess with rectal pain pus drainage hip pain headache low-grade fever sweats worsening in the last 3 days  He is monogamous with girlfriend  Declines any relevant past medical history  BM today  Urgent care limited without stat labs IV antibiotics advanced imaging colorectal consult obs  Patient will go to local ER now by car, call 911 if worsening    Follow up with primary care provider with any problems, questions or concerns or if symptoms worsen or fail to improve. Patient agreed to plan and verbalized understanding.    Joycelyn Zambrano, JAYE-BC  [facility] [place]

## 2024-07-17 VITALS
OXYGEN SATURATION: 98 % | RESPIRATION RATE: 16 BRPM | SYSTOLIC BLOOD PRESSURE: 117 MMHG | HEART RATE: 83 BPM | TEMPERATURE: 98.8 F | DIASTOLIC BLOOD PRESSURE: 65 MMHG

## 2024-07-17 LAB
ADV 40+41 DNA STL QL NAA+NON-PROBE: NEGATIVE
ALBUMIN SERPL BCG-MCNC: 3.8 G/DL (ref 3.5–5.2)
ALP SERPL-CCNC: 63 U/L (ref 40–150)
ALT SERPL W P-5'-P-CCNC: 20 U/L (ref 0–70)
ANION GAP SERPL CALCULATED.3IONS-SCNC: 9 MMOL/L (ref 7–15)
AST SERPL W P-5'-P-CCNC: 26 U/L (ref 0–45)
ASTRO TYP 1-8 RNA STL QL NAA+NON-PROBE: NEGATIVE
BASOPHILS # BLD AUTO: 0 10E3/UL (ref 0–0.2)
BASOPHILS NFR BLD AUTO: 0 %
BILIRUB SERPL-MCNC: 0.4 MG/DL
BUN SERPL-MCNC: 8.3 MG/DL (ref 6–20)
C CAYETANENSIS DNA STL QL NAA+NON-PROBE: NEGATIVE
CALCIUM SERPL-MCNC: 8.5 MG/DL (ref 8.8–10.4)
CAMPYLOBACTER DNA SPEC NAA+PROBE: NEGATIVE
CHLORIDE SERPL-SCNC: 102 MMOL/L (ref 98–107)
CREAT SERPL-MCNC: 1.05 MG/DL (ref 0.67–1.17)
CRP SERPL-MCNC: 67.6 MG/L
CRYPTOSP DNA STL QL NAA+NON-PROBE: NEGATIVE
E COLI O157 DNA STL QL NAA+NON-PROBE: NORMAL
E HISTOLYT DNA STL QL NAA+NON-PROBE: NEGATIVE
EAEC ASTA GENE ISLT QL NAA+PROBE: NEGATIVE
EC STX1+STX2 GENES STL QL NAA+NON-PROBE: NEGATIVE
EGFRCR SERPLBLD CKD-EPI 2021: >90 ML/MIN/1.73M2
EOSINOPHIL # BLD AUTO: 0.3 10E3/UL (ref 0–0.7)
EOSINOPHIL NFR BLD AUTO: 7 %
EPEC EAE GENE STL QL NAA+NON-PROBE: NEGATIVE
ERYTHROCYTE [DISTWIDTH] IN BLOOD BY AUTOMATED COUNT: 13 % (ref 10–15)
ETEC LTA+ST1A+ST1B TOX ST NAA+NON-PROBE: NEGATIVE
FLEXIBLE SIGMOIDOSCOPY: NORMAL
G LAMBLIA DNA STL QL NAA+NON-PROBE: NEGATIVE
GLUCOSE SERPL-MCNC: 116 MG/DL (ref 70–99)
HCO3 SERPL-SCNC: 26 MMOL/L (ref 22–29)
HCT VFR BLD AUTO: 40.6 % (ref 40–53)
HGB BLD-MCNC: 13.8 G/DL (ref 13.3–17.7)
IMM GRANULOCYTES # BLD: 0 10E3/UL
IMM GRANULOCYTES NFR BLD: 1 %
LYMPHOCYTES # BLD AUTO: 1.1 10E3/UL (ref 0.8–5.3)
LYMPHOCYTES NFR BLD AUTO: 23 %
MCH RBC QN AUTO: 30.5 PG (ref 26.5–33)
MCHC RBC AUTO-ENTMCNC: 34 G/DL (ref 31.5–36.5)
MCV RBC AUTO: 90 FL (ref 78–100)
MONOCYTES # BLD AUTO: 0.6 10E3/UL (ref 0–1.3)
MONOCYTES NFR BLD AUTO: 13 %
NEUTROPHILS # BLD AUTO: 2.7 10E3/UL (ref 1.6–8.3)
NEUTROPHILS NFR BLD AUTO: 56 %
NOROVIRUS GI+II RNA STL QL NAA+NON-PROBE: NEGATIVE
NRBC # BLD AUTO: 0 10E3/UL
NRBC BLD AUTO-RTO: 0 /100
P SHIGELLOIDES DNA STL QL NAA+NON-PROBE: NEGATIVE
PLATELET # BLD AUTO: 176 10E3/UL (ref 150–450)
POTASSIUM SERPL-SCNC: 4 MMOL/L (ref 3.4–5.3)
PROT SERPL-MCNC: 6.7 G/DL (ref 6.4–8.3)
RBC # BLD AUTO: 4.52 10E6/UL (ref 4.4–5.9)
RVA RNA STL QL NAA+NON-PROBE: NEGATIVE
SALMONELLA SP RPOD STL QL NAA+PROBE: NEGATIVE
SAPO I+II+IV+V RNA STL QL NAA+NON-PROBE: NEGATIVE
SHIGELLA SP+EIEC IPAH ST NAA+NON-PROBE: NEGATIVE
SODIUM SERPL-SCNC: 137 MMOL/L (ref 135–145)
V CHOLERAE DNA SPEC QL NAA+PROBE: NEGATIVE
VIBRIO DNA SPEC NAA+PROBE: NEGATIVE
WBC # BLD AUTO: 4.9 10E3/UL (ref 4–11)
Y ENTEROCOL DNA STL QL NAA+PROBE: NEGATIVE

## 2024-07-17 PROCEDURE — 85025 COMPLETE CBC W/AUTO DIFF WBC: CPT | Performed by: INTERNAL MEDICINE

## 2024-07-17 PROCEDURE — 999N000099 HC STATISTIC MODERATE SEDATION < 10 MIN: Performed by: INTERNAL MEDICINE

## 2024-07-17 PROCEDURE — 250N000011 HC RX IP 250 OP 636: Performed by: INTERNAL MEDICINE

## 2024-07-17 PROCEDURE — 45331 SIGMOIDOSCOPY AND BIOPSY: CPT | Performed by: INTERNAL MEDICINE

## 2024-07-17 PROCEDURE — 45330 DIAGNOSTIC SIGMOIDOSCOPY: CPT | Performed by: INTERNAL MEDICINE

## 2024-07-17 PROCEDURE — 88342 IMHCHEM/IMCYTCHM 1ST ANTB: CPT | Mod: TC | Performed by: INTERNAL MEDICINE

## 2024-07-17 PROCEDURE — G0378 HOSPITAL OBSERVATION PER HR: HCPCS

## 2024-07-17 PROCEDURE — 88342 IMHCHEM/IMCYTCHM 1ST ANTB: CPT | Mod: 26 | Performed by: PATHOLOGY

## 2024-07-17 PROCEDURE — 88341 IMHCHEM/IMCYTCHM EA ADD ANTB: CPT | Mod: 26 | Performed by: PATHOLOGY

## 2024-07-17 PROCEDURE — 87389 HIV-1 AG W/HIV-1&-2 AB AG IA: CPT | Performed by: INTERNAL MEDICINE

## 2024-07-17 PROCEDURE — 80053 COMPREHEN METABOLIC PANEL: CPT | Performed by: INTERNAL MEDICINE

## 2024-07-17 PROCEDURE — 99239 HOSP IP/OBS DSCHRG MGMT >30: CPT | Performed by: INTERNAL MEDICINE

## 2024-07-17 PROCEDURE — 96376 TX/PRO/DX INJ SAME DRUG ADON: CPT | Mod: 59

## 2024-07-17 PROCEDURE — 96375 TX/PRO/DX INJ NEW DRUG ADDON: CPT

## 2024-07-17 PROCEDURE — 258N000003 HC RX IP 258 OP 636: Performed by: INTERNAL MEDICINE

## 2024-07-17 PROCEDURE — G0379 DIRECT REFER HOSPITAL OBSERV: HCPCS

## 2024-07-17 PROCEDURE — 88305 TISSUE EXAM BY PATHOLOGIST: CPT | Mod: 26 | Performed by: PATHOLOGY

## 2024-07-17 PROCEDURE — 36415 COLL VENOUS BLD VENIPUNCTURE: CPT | Performed by: INTERNAL MEDICINE

## 2024-07-17 PROCEDURE — 87529 HSV DNA AMP PROBE: CPT | Performed by: INTERNAL MEDICINE

## 2024-07-17 PROCEDURE — 250N000013 HC RX MED GY IP 250 OP 250 PS 637

## 2024-07-17 PROCEDURE — 87491 CHLMYD TRACH DNA AMP PROBE: CPT | Performed by: INTERNAL MEDICINE

## 2024-07-17 PROCEDURE — 86140 C-REACTIVE PROTEIN: CPT | Performed by: INTERNAL MEDICINE

## 2024-07-17 PROCEDURE — 87507 IADNA-DNA/RNA PROBE TQ 12-25: CPT

## 2024-07-17 RX ORDER — NALOXONE HYDROCHLORIDE 0.4 MG/ML
0.2 INJECTION, SOLUTION INTRAMUSCULAR; INTRAVENOUS; SUBCUTANEOUS
Status: DISCONTINUED | OUTPATIENT
Start: 2024-07-17 | End: 2024-07-17 | Stop reason: HOSPADM

## 2024-07-17 RX ORDER — NALOXONE HYDROCHLORIDE 0.4 MG/ML
0.4 INJECTION, SOLUTION INTRAMUSCULAR; INTRAVENOUS; SUBCUTANEOUS
Status: DISCONTINUED | OUTPATIENT
Start: 2024-07-17 | End: 2024-07-17 | Stop reason: HOSPADM

## 2024-07-17 RX ORDER — CIPROFLOXACIN 500 MG/1
500 TABLET, FILM COATED ORAL 2 TIMES DAILY
Qty: 14 TABLET | Refills: 0 | Status: SHIPPED | OUTPATIENT
Start: 2024-07-17 | End: 2024-07-24

## 2024-07-17 RX ORDER — METRONIDAZOLE 500 MG/1
500 TABLET ORAL 2 TIMES DAILY
Qty: 14 TABLET | Refills: 0 | Status: SHIPPED | OUTPATIENT
Start: 2024-07-17 | End: 2024-07-24

## 2024-07-17 RX ORDER — FENTANYL CITRATE 50 UG/ML
INJECTION, SOLUTION INTRAMUSCULAR; INTRAVENOUS PRN
Status: DISCONTINUED | OUTPATIENT
Start: 2024-07-17 | End: 2024-07-17 | Stop reason: HOSPADM

## 2024-07-17 RX ORDER — EPINEPHRINE 1 MG/ML
0.1 INJECTION, SOLUTION INTRAMUSCULAR; SUBCUTANEOUS
Status: DISCONTINUED | OUTPATIENT
Start: 2024-07-17 | End: 2024-07-17 | Stop reason: HOSPADM

## 2024-07-17 RX ORDER — SIMETHICONE 40MG/0.6ML
133 SUSPENSION, DROPS(FINAL DOSAGE FORM)(ML) ORAL
Status: DISCONTINUED | OUTPATIENT
Start: 2024-07-17 | End: 2024-07-17 | Stop reason: HOSPADM

## 2024-07-17 RX ORDER — FLUMAZENIL 0.1 MG/ML
0.2 INJECTION, SOLUTION INTRAVENOUS
Status: DISCONTINUED | OUTPATIENT
Start: 2024-07-17 | End: 2024-07-17 | Stop reason: HOSPADM

## 2024-07-17 RX ORDER — ATROPINE SULFATE 0.1 MG/ML
1 INJECTION INTRAVENOUS
Status: DISCONTINUED | OUTPATIENT
Start: 2024-07-17 | End: 2024-07-17 | Stop reason: HOSPADM

## 2024-07-17 RX ORDER — DIPHENHYDRAMINE HYDROCHLORIDE 50 MG/ML
25-50 INJECTION INTRAMUSCULAR; INTRAVENOUS
Status: DISCONTINUED | OUTPATIENT
Start: 2024-07-17 | End: 2024-07-17 | Stop reason: HOSPADM

## 2024-07-17 RX ORDER — FENTANYL CITRATE 50 UG/ML
50-100 INJECTION, SOLUTION INTRAMUSCULAR; INTRAVENOUS EVERY 5 MIN PRN
Status: DISCONTINUED | OUTPATIENT
Start: 2024-07-17 | End: 2024-07-17 | Stop reason: HOSPADM

## 2024-07-17 RX ADMIN — HYDROMORPHONE HYDROCHLORIDE 0.3 MG: 1 INJECTION, SOLUTION INTRAMUSCULAR; INTRAVENOUS; SUBCUTANEOUS at 11:20

## 2024-07-17 RX ADMIN — SODIUM CHLORIDE: 9 INJECTION, SOLUTION INTRAVENOUS at 13:06

## 2024-07-17 RX ADMIN — SODIUM PHOSPHATE, DIBASIC AND SODIUM PHOSPHATE, MONOBASIC 1 ENEMA: 7; 19 ENEMA RECTAL at 11:23

## 2024-07-17 RX ADMIN — PIPERACILLIN AND TAZOBACTAM 3.38 G: 3; .375 INJECTION, POWDER, FOR SOLUTION INTRAVENOUS at 02:06

## 2024-07-17 RX ADMIN — PANTOPRAZOLE SODIUM 40 MG: 40 INJECTION, POWDER, FOR SOLUTION INTRAVENOUS at 06:04

## 2024-07-17 RX ADMIN — PIPERACILLIN AND TAZOBACTAM 3.38 G: 3; .375 INJECTION, POWDER, FOR SOLUTION INTRAVENOUS at 10:30

## 2024-07-17 RX ADMIN — CIPROFLOXACIN 400 MG: 2 INJECTION, SOLUTION INTRAVENOUS at 06:13

## 2024-07-17 RX ADMIN — METRONIDAZOLE 500 MG: 5 INJECTION, SOLUTION INTRAVENOUS at 06:09

## 2024-07-17 ASSESSMENT — ACTIVITIES OF DAILY LIVING (ADL)
ADLS_ACUITY_SCORE: 18

## 2024-07-17 NOTE — PLAN OF CARE
Goal Outcome Evaluation:    Problem: Adult Inpatient Plan of Care  Goal: Optimal Comfort and Wellbeing    Problem: Infection  Goal: Absence of Infection Signs and Symptoms         Pt alert and oriented. VSS on RA. Slept well. IVF and IV abx running. Indp in room. NPO.

## 2024-07-17 NOTE — PHARMACY-ADMISSION MEDICATION HISTORY
Admission medication history completed at [facility]. Please see Medication Scribe Admission Medication History note from 07/16/2024.

## 2024-07-17 NOTE — UTILIZATION REVIEW
Admission Status; Secondary Review Determination   Under the authority of the Utilization Management Committee, the utilization review process indicated a secondary review on Heath Quevedo. The review outcome is based on review of the medical records, discussions with staff, and applying clinical experience noted on the date of the review.   (x) Inpatient Status Appropriate - This patient's medical care is consistent with medical management for inpatient care and reasonable inpatient medical practice.     RATIONALE FOR DETERMINATION   24-year-old male admitted with rectal pain secondary to acute proctitis seen on imaging.  Started on multiple IV antibiotics and GI consulted.  Remains n.p.o. for flexible sigmoidoscopy today.  Enema ordered for prep along with stool studies.    At the time of admission with the information available to the attending physician more than 2 nights Hospital complex care was anticipated, based on patient risk of adverse outcome if treated as outpatient and complex care required. Inpatient admission is appropriate based on the Medicare guidelines.   The information on this document is developed by the utilization review team in order for the business office to ensure compliance. This only denotes the appropriateness of proper admission status and does not reflect the quality of care rendered.   The definitions of Inpatient Status and Observation Status used in making the determination above are those provided in the CMS Coverage Manual, Chapter 1 and Chapter 6, section 70.4.   Sincerely,   Eben Rushing MD  Utilization Review  Physician Advisor  [facility]

## 2024-07-17 NOTE — PLAN OF CARE
Goal Outcome Evaluation:    Problem: Pain Acute  Goal: Optimal Pain Control and Function  Outcome: Progressing     Problem: Infection  Goal: Absence of Infection Signs and Symptoms  Outcome: Progressing     Problem: Oral Intake Inadequate  Goal: Improved Oral Intake  Outcome: Progressing        Pt alert and oriented. VSS on RA. Reports minor pain in rectal area and headache, pt fine without medication but understands they are available. IVF running. Indp. Makes needs known. NPO at midnight for potential scope.

## 2024-07-17 NOTE — H&P
Assessment and Plan  Active Problems:    Proctitis   24 year old male with unremarkable past medical history who presents to this ED for evaluation of rectal pain, admitted with acute proctitis      Acute proctitis  - Etiology is unclear, no family history of Crohn's or ulcerative colitis.  - Patient presented with rectal pain for the past 2 days with fever  - CT abdomen shows Mild inflammation of the rectum suggesting infectious or inflammatory proctitis. No evidence of perianal abscess or fistula.   - GI consult, appreciate input-   - Start on Cipro Flagyl as recommended by GI  - Keep n.p.o. after midnight anticipating scoping  -Monitor CRP    Rectal pain  -Continues Dilaudid IV as needed          VTE prophylaxis:  Pneumatic Compression Devices  DIET: Orders Placed This Encounter      Clear Liquid Diet      NPO for Medical/Clinical Reasons Except for: Meds, Ice Chips  Disposition/Barriers to discharge: GI consult, n.p.o., IV antibiotic  Code Status:Full Code    HPI:   Heath Quevedo is a 24 year old male with unremarkable past medical history who presents to this ED for evaluation of rectal pain   Basically the patient was advised by urgent care to present to the ED after being diagnosed with a rectal abscess. He reports experiencing fevers, migraines, and anal tenderness since Sunday, 07/14/2024. The patient also endorses some pus around his anus and abdominal pain, though he no longer experiencing abdominal pain anymore. The pain is reportedly all around the anus but does not radiate to other regions. The patient additionally endorses left hip pain that began yesterday evening, 07/15/2024, and he experiences pain while walking. He states that he has had a slight runny nose, but this is normal for him. He denies others with similar symptoms, having similar episodes in the past, erythema around his left hip, scrotum pain, and sore throat.     The patient states that he can pass forcefully, but without force,  he would not be able to pass normally. 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 He took an over-the-counter migraine medication at 8 AM today, 07/16/2024. There were no other complaints/concerns at this time.  No history of previous colonoscopies.  No history of anal intercourse       Past Medical History:   Diagnosis Date    Plantar warts      No past surgical history on file.  Family History   Problem Relation Age of Onset    Gestational Diabetes Mother     No Known Problems Father     Asthma Brother     Allergies Brother      Social History     Socioeconomic History    Marital status: Single     Spouse name: Not on file    Number of children: Not on file    Years of education: Not on file    Highest education level: Not on file   Occupational History    Not on file   Tobacco Use    Smoking status: Never    Smokeless tobacco: Never   Vaping Use    Vaping status: Every Day   Substance and Sexual Activity    Alcohol use: No    Drug use: No    Sexual activity: Never   Other Topics Concern    Not on file   Social History Narrative    Not on file     Social Determinants of Health     Financial Resource Strain: Not on file   Food Insecurity: Not on file   Transportation Needs: Not on file   Physical Activity: Not on file   Stress: Not on file   Social Connections: Unknown (10/31/2022)    Received from [employer] [facility]    Social Connections     Frequency of Communication with Friends and Family: Not on file   Interpersonal Safety: Not on file   Housing Stability: Not on file     No Known Allergies    PRIOR TO ADMISSION MEDICATIONS   No medications prior to admission.        REVIEW OF SYSTEMS:  12 point reviewed pertinent negatives and positives in HPI all others negative     PHYSICAL EXAM  B/P:116/78 T:97.8 P:72 R: 20     Head:    Normocephalic, without obvious abnormality, atraumatic   Eyes:    PERRL, conjunctiva/corneas clear, EOM's intact,both eyes    Ears:    Normal external ear canals no  drainage or erythema bilat.   Nose:   Nares normal by gross inspection,  mucosa normal, no drainage or sinus tenderness   Throat:   Lips, mucosa, and tongue normal; teeth and gums normal   Neck:   Supple, symmetrical, trachea midline, no adenopathy;        thyroid:  No enlargement/tenderness/nodules   Back:     Symmetric, no curvature, ROM normal, no CVA tenderness   Lungs:   Normal breath sounds.   Chest wall:    No tenderness or deformity   Heart:    Regular rate and rhythm, S1 and S2 normal, I/VI systolic murmur, no rubs, no JVD, no edema   Abdomen:     Soft, non-tender, bowel sounds active all four quadrants,     no masses, no hepatosplenomegaly   Musculoskeletal:   Extremities are warm and non-tender, atraumatic, no joint swelling or tenderness   Pulses:   2+ and symmetric all extremities   Skin:   Skin color, texture, turgor normal, no rashes or lesions on exposed areas, please see nursing assessment for full skin assessment   Neurologic: Grossly intact, no focal deficit.         PERTINENT LABS and RADIOLOGY     I have personally reviewed the following data over the past 24 hrs:    7.4  \   15.6   / 199     135 97 (L) 10.0 /  101 (H)   3.9 23 1.03 \     ALT: 11 AST: 30 AP: 77 TBILI: 0.5   ALB: 4.7 TOT PROTEIN: 8.3 LIPASE: N/A     Procal: N/A CRP: 85.00 (H) Lactic Acid: 1.1         Imaging results reviewed over the past 24 hrs:   Recent Results (from the past 24 hour(s))   CT Abdomen Pelvis w Contrast    Narrative    EXAM: CT ABDOMEN PELVIS W CONTRAST  LOCATION: [facility]  DATE: 7/16/2024    INDICATION: left hip pain, fever, perianal pain with mucus drainage  COMPARISON: None.  TECHNIQUE: CT scan of the abdomen and pelvis was performed following injection of IV contrast. Multiplanar reformats were obtained. Dose reduction techniques were used.  CONTRAST: ISOVUE 370 90 mL    FINDINGS:   LOWER CHEST: Normal.    HEPATOBILIARY: Normal.    PANCREAS: Normal.    SPLEEN: Normal.    ADRENAL  GLANDS: Normal.    KIDNEYS/BLADDER: Normal.    BOWEL: Mild wall thickening and mural hyperenhancement of the rectum. No upstream dilation, extraluminal gas, fluid collection.    LYMPH NODES: Normal.    VASCULATURE: Normal.    PELVIC ORGANS: Normal.    MUSCULOSKELETAL: Normal.      Impression    IMPRESSION:   Mild inflammation of the rectum suggesting infectious or inflammatory proctitis. No evidence of perianal abscess or fistula. Note that MRI is more sensitive for detecting fistulas.     EKG:    Discussed with patient, family and nursing staff     Hiren Bhatti MD  [facility] Internal Medicine Hospitalist  [phone]

## 2024-07-17 NOTE — DISCHARGE SUMMARY
"[facility]  Hospitalist Discharge Summary      Date of Admission:  7/16/2024  Date of Discharge:  7/17/2024  Discharging Provider: Rachel Padilla DO  Discharge Service: Hospitalist Service    Discharge Diagnoses   Proctitis    Clinically Significant Risk Factors     # Obesity: Estimated body mass index is 30.95 kg/m  as calculated from the following:    Height as of an earlier encounter on 7/16/24: 1.651 m (5' 5\").    Weight as of an earlier encounter on 7/16/24: 84.4 kg (186 lb).       Follow-ups Needed After Discharge   Follow-up Appointments     Follow-up and recommended labs and tests       Follow up with primary care provider,  within 3-4 days .        Follow-up with PCP regarding STD testing    Unresulted Labs Ordered in the Past 30 Days of this Admission       Date and Time Order Name Status Description    7/17/2024  2:01 PM HIV Antigen Antibody Combo Cascade In process     7/17/2024 10:27 AM Enteric Bacteria and Virus Panel PCR In process     7/16/2024  2:33 PM Blood Culture Arm, Right Preliminary     7/16/2024  2:33 PM Blood Culture Arm, Left Preliminary             Discharge Disposition   Discharged to home  Condition at discharge: Stable    Hospital Course   Patient is a 24-year-old male who presented with rectal pain.  Found to have prostatitis.  Underwent scoping by GI.  Biopsy taken.  STD test ordered prior to discharge.  Patient found stable and discharged home on antibiotics.    Consultations This Hospital Stay   GASTROENTEROLOGY IP CONSULT    Code Status   Full Code    Time Spent on this Encounter   I, Rachel Padilla DO, personally saw the patient today and spent greater than 30 minutes discharging this patient.       DO SANA Reinoso [address] [address]  Phone: [phone]  Fax: [phone]  ______________________________________________________________________    Physical Exam   Vital Signs: " Temp: 98.8  F (37.1  C) Temp src: Temporal BP: 117/65 Pulse: 83   Resp: 16 SpO2: 98 % O2 Device: None (Room air)    Weight: 0 lbs 0 oz         Primary Care Physician   Physician No Ref-Primary    Discharge Orders      Treponema Abs w Reflex to RPR and Titer     Reason for your hospital stay    Proctitis     Follow-up and recommended labs and tests     Follow up with primary care provider,  within 3-4 days .     Activity    Your activity upon discharge: activity as tolerated     Chlamydia trachomatis/Neisseria gonorrhoeae by PCR (rectum)     Herpes Simplex Virus 1&2 by PCR (anal)     Diet    Follow this diet upon discharge: Orders Placed This Encounter      Regular Diet Adult       Significant Results and Procedures   Most Recent 3 CBC's:  Recent Labs   Lab Test 07/17/24  0704 07/16/24  1453   WBC 4.9 7.4   HGB 13.8 15.6   MCV 90 90    199     Most Recent 3 BMP's:  Recent Labs   Lab Test 07/17/24  0704 07/16/24  1453    135   POTASSIUM 4.0 3.9   CHLORIDE 102 97*   CO2 26 23   BUN 8.3 10.0   CR 1.05 1.03   ANIONGAP 9 15   MCIHELLE 8.5* 9.2   * 101*     Most Recent 2 LFT's:  Recent Labs   Lab Test 07/17/24  0704 07/16/24  1453   AST 26 30   ALT 20 11   ALKPHOS 63 77   BILITOTAL 0.4 0.5   ,   Results for orders placed or performed during the hospital encounter of 07/16/24   CT Abdomen Pelvis w Contrast    Narrative    EXAM: CT ABDOMEN PELVIS W CONTRAST  LOCATION: [facility]  DATE: 7/16/2024    INDICATION: left hip pain, fever, perianal pain with mucus drainage  COMPARISON: None.  TECHNIQUE: CT scan of the abdomen and pelvis was performed following injection of IV contrast. Multiplanar reformats were obtained. Dose reduction techniques were used.  CONTRAST: ISOVUE 370 90 mL    FINDINGS:   LOWER CHEST: Normal.    HEPATOBILIARY: Normal.    PANCREAS: Normal.    SPLEEN: Normal.    ADRENAL GLANDS: Normal.    KIDNEYS/BLADDER: Normal.    BOWEL: Mild wall thickening and mural  hyperenhancement of the rectum. No upstream dilation, extraluminal gas, fluid collection.    LYMPH NODES: Normal.    VASCULATURE: Normal.    PELVIC ORGANS: Normal.    MUSCULOSKELETAL: Normal.      Impression    IMPRESSION:   Mild inflammation of the rectum suggesting infectious or inflammatory proctitis. No evidence of perianal abscess or fistula. Note that MRI is more sensitive for detecting fistulas.       Discharge Medications   Current Discharge Medication List        START taking these medications    Details   ciprofloxacin (CIPRO) 500 MG tablet Take 1 tablet (500 mg) by mouth 2 times daily for 7 days  Qty: 14 tablet, Refills: 0    Associated Diagnoses: Proctitis      metroNIDAZOLE (FLAGYL) 500 MG tablet Take 1 tablet (500 mg) by mouth 2 times daily for 7 days  Qty: 14 tablet, Refills: 0    Associated Diagnoses: Proctitis           Allergies   No Known Allergies

## 2024-07-17 NOTE — PLAN OF CARE
Problem: Adult Inpatient Plan of Care  Goal: Absence of Hospital-Acquired Illness or Injury  Outcome: Progressing  Intervention: Identify and Manage Fall Risk  Recent Flowsheet Documentation  Taken 7/17/2024 1000 by Carri Rosen, RN  Safety Promotion/Fall Prevention:   patient and family education   nonskid shoes/slippers when out of bed   safety round/check completed  Intervention: Prevent Skin Injury  Recent Flowsheet Documentation  Taken 7/17/2024 1000 by Carri Rosen, RN  Body Position: position changed independently     Problem: Pain Acute  Goal: Optimal Pain Control and Function  Outcome: Progressing  Intervention: Develop Pain Management Plan  Recent Flowsheet Documentation  Taken 7/17/2024 1120 by Carri Rosen, RN  Pain Management Interventions: medication (see MAR)     /85   Pulse 100   Temp 98.4  F (36.9  C) (Temporal)   Resp 16   SpO2 100%     Pt reports rectal pain, prn dilaudid given and effective. Enema given prior to procedure. Mom and girlfriend at bedside. Pt discharge home with mom and girlfriend after STD swabs obtained. All belongings taken with pt and paperwork sent with.

## 2024-07-17 NOTE — PRE-PROCEDURE
GENERAL PRE-PROCEDURE:   Procedure:  Flexible sigmoidoscopy  Date/Time:  7/17/2024 1:32 PM    Verbal consent obtained?: Yes    Written consent obtained?: Yes    Risks and benefits: Risks, benefits and alternatives were discussed    Consent given by:  Patient  Patient states understanding of procedure being performed: Yes    Patient's understanding of procedure matches consent: Yes    Procedure consent matches procedure scheduled: Yes    Expected level of sedation:  Moderate  Appropriately NPO:  Yes  Mallampati  :  Grade 1- soft palate, uvula, tonsillar pillars, and posterior pharyngeal wall visible  Lungs:  Lungs clear with good breath sounds bilaterally  Heart:  Normal heart sounds and rate  History & Physical reviewed:  History and physical reviewed and no updates needed  Statement of review:  I have reviewed the lab findings, diagnostic data, medications, and the plan for sedation

## 2024-07-17 NOTE — PROGRESS NOTES
"[facility]    PROGRESS NOTE - Hospitalist Service    ASSESSMENT AND PLAN     Active Problems:    Proctitis    Heath Quevedo is a 24 year old male with unremarkable past medical history who presents to this ED for evaluation of rectal pain, admitted with acute proctitis      Acute proctitis  - Etiology is unclear, no family history of Crohn's or ulcerative colitis.  - CT abdomen shows Mild inflammation of the rectum suggesting infectious or inflammatory proctitis. No evidence of perianal abscess or fistula.   - GI consult- planing for scope   -Initiated on Cipro Flagyl 7/16 as recommended by GI  -Monitor CRP  Discussed with GI team     Rectal pain  -Continues Dilaudid IV as needed    Barriers to discharge: GI recommendations, IV antibiotics    Anticipated length of stay: 1 to 2 days    Medically Ready for Discharge: Anticipated Tomorrow    Clinically Significant Risk Factors Present on Admission                              # Obesity: Estimated body mass index is 30.95 kg/m  as calculated from the following:    Height as of an earlier encounter on 7/16/24: 1.651 m (5' 5\").    Weight as of an earlier encounter on 7/16/24: 84.4 kg (186 lb).                  Subjective:  Patient laying in bed. Complaining of pain to his buttocks. No fever or chills.     Mother and girlfriend at bedside.    PHYSICAL EXAM  Temp:  [97.8  F (36.6  C)-101.4  F (38.6  C)] 98.1  F (36.7  C)  Pulse:  [] 76  Resp:  [14-25] 20  BP: ()/(55-84) 112/55  SpO2:  [97 %-99 %] 97 %  Wt Readings from Last 1 Encounters:   07/16/24 84.4 kg (186 lb)     No intake or output data in the 24 hours ending 07/17/24 1036   There is no height or weight on file to calculate BMI.    GENRL: Alert and answering questions appropriately. Not in acute distress. Lying in bed   CHEST: Clear to auscultation bilaterally. No wheezes, rhonchi or crackles. Breathing easily   HEART: Regular rate and rhythm, S1S2 auscultated. No murmurs, rubs " "or gallops.   ABDMN: Soft. Non-tender, non-distended. No organomegaly. No guarding or rigidity. Bowel sounds hypoactive   EXTRM: No pedal edema  NEURO: Cranial nerves II-XII grossly intact. No focal neurological deficit. No involuntary movements. Normal mentation  PSYCH: Normal affect and mood.   INTGM: No skin rash    Medical Decision Making       55 MINUTES SPENT BY ME on the date of service doing chart review, history, exam, documentation & further activities per the note.      PERTINENT LABS/IMAGING:  No results found for this visit on 07/16/24.  Most Recent 3 CBC's:  Recent Labs   Lab Test 07/17/24  0704 07/16/24  1453   WBC 4.9 7.4   HGB 13.8 15.6   MCV 90 90    199     Most Recent 3 BMP's:  Recent Labs   Lab Test 07/17/24  0704 07/16/24  1453    135   POTASSIUM 4.0 3.9   CHLORIDE 102 97*   CO2 26 23   BUN 8.3 10.0   CR 1.05 1.03   ANIONGAP 9 15   MICHELLE 8.5* 9.2   * 101*     Most Recent 2 LFT's:  Recent Labs   Lab Test 07/17/24  0704 07/16/24  1453   AST 26 30   ALT 20 11   ALKPHOS 63 77   BILITOTAL 0.4 0.5       No results for input(s): \"CHOL\", \"HDL\", \"LDL\", \"TRIG\", \"CHOLHDLRATIO\" in the last 93612 hours.  No results for input(s): \"LDL\" in the last 03581 hours.  Recent Labs   Lab Test 07/17/24  0704      POTASSIUM 4.0   CHLORIDE 102   CO2 26   *   BUN 8.3   CR 1.05   GFRESTIMATED >90   MICHELLE 8.5*     No results for input(s): \"A1C\" in the last 13025 hours.  Recent Labs   Lab Test 07/17/24  0704 07/16/24  1453   HGB 13.8 15.6     No results for input(s): \"TROPONINI\" in the last 19707 hours.  No results for input(s): \"BNP\", \"NTBNPI\", \"NTBNP\" in the last 23921 hours.  No results for input(s): \"TSH\" in the last 56816 hours.  No results for input(s): \"INR\" in the last 39619 hours.    Rachel Padilla,   Hospitalist Service  [facility]      "

## 2024-07-17 NOTE — CONSULTS
"GI CONSULT NOTE    Name: Heath Quevedo  Medical Record #: [id number]  YOB: 2000  Date of Admission: 7/16/2024  Date/Time: 7/17/2024/9:42 AM     CHIEF COMPLAINT: Proctitis     HISTORY OF PRESENT ILLNESS: We were asked to see Heath Quevedo by Dr. Bhatti for \"Acute proctitis.\"    Heath Quevedo is a 24 year old male with no pertinent past medical history presents with rectal pain.     Patient notes that symptoms began on Sunday with bodyaches and headache.  Overnight on Sunday he had some subjective fevers and chills.  On Monday morning he notes onset of rectal pain.  Throughout Monday and Tuesday he continued to have tenesmus and rectal pain as well as difficulty evacuating the rectum unless he was excessively straining.  When he is able to get stool out, it is loose.  He also notes mucus/white discharge with stools and occasionally without.  No melena or hematochezia.  No abdominal pain.  1 episode of nausea on Monday but nothing since.  No vomiting.  Normally has 1 soft formed stool daily without any overall GI concerns.  No recent weight loss.    No family history of inflammatory bowel disease or GI pathology.  No tobacco, illicit drug use, alcohol only socially on the weekends.  Is monogamous with 1 sexual partner and denies any anal intercourse or trauma, has past history of chlamydia within the last year.  Not been tested for any other STDs.  Did recently travel to [place] where he states [place] as well as eating seafood, no out-of-country travel.  No recent antibiotics.  No NSAIDs.  No sick contacts.  No changes in medication or diet.  No previous endoscopy.     REVIEW OF SYSTEMS (ROS): Complete review of systems negative other than listed in HPI.    PAST MEDICAL HISTORY:  Past Medical History:   Diagnosis Date    Plantar warts       FAMILY HISTORY:  Family History   Problem Relation Age of Onset    Gestational Diabetes Mother     No Known Problems Father     Asthma " Brother     Allergies Brother      SOCIAL HISTORY:  Social History     Socioeconomic History    Marital status: Single     Spouse name: Not on file    Number of children: Not on file    Years of education: Not on file    Highest education level: Not on file   Occupational History    Not on file   Tobacco Use    Smoking status: Never    Smokeless tobacco: Never   Vaping Use    Vaping status: Every Day   Substance and Sexual Activity    Alcohol use: No    Drug use: No    Sexual activity: Never   Other Topics Concern    Not on file   Social History Narrative    Not on file     Social Determinants of Health     Financial Resource Strain: Not on file   Food Insecurity: Not on file   Transportation Needs: Not on file   Physical Activity: Not on file   Stress: Not on file   Social Connections: Unknown (10/31/2022)    Received from [facility]    Social Connections     Frequency of Communication with Friends and Family: Not on file   Interpersonal Safety: Not on file   Housing Stability: Not on file     MEDICATIONS PRIOR TO ADMISSION:   No medications prior to admission.        ALLERGIES: Patient has no known allergies.    PHYSICAL EXAM:    /55 (BP Location: Left arm)   Pulse 76   Temp 98.1  F (36.7  C) (Oral)   Resp 20   SpO2 97%     GENERAL: Pleasant, no obvious distress  NECK: Supple without adenopathy  EYES: No scleral icterus  LUNGS: Clear to auscultation bilaterally  HEART: Regular rate and rhythm, S1 and S2 present, no lower extremity edema  ABDOMEN: Non-distended. Positive bowel sounds. Soft, non-tender, no guarding/rebound/mass, no obvious organomegaly  MUSKULOSKELETAL:  Warm and well perfused, moves all extremities well  SKIN: No jaundice  NEUROLOGIC: Alert and oriented  PSYCHIATRIC: Normal affect    LAB DATA:  CMP Results:   Recent Labs   Lab Test 07/17/24  0704 07/16/24  1453    135   POTASSIUM 4.0 3.9   CHLORIDE 102 97*   CO2 26 23   ANIONGAP 9 15   * 101*    BUN 8.3 10.0   CR 1.05 1.03   BILITOTAL 0.4 0.5   ALKPHOS 63 77   ALT 20 11   AST 26 30      CBC  Recent Labs   Lab 07/17/24  0704 07/16/24  1453   WBC 4.9 7.4   RBC 4.52 5.06   HGB 13.8 15.6   HCT 40.6 45.7   MCV 90 90   MCH 30.5 30.8   MCHC 34.0 34.1   RDW 13.0 13.1    199     IMAGING:  EXAM: CT ABDOMEN PELVIS W CONTRAST  LOCATION: [facility]  DATE: 7/16/2024     INDICATION: left hip pain, fever, perianal pain with mucus drainage  COMPARISON: None.  TECHNIQUE: CT scan of the abdomen and pelvis was performed following injection of IV contrast. Multiplanar reformats were obtained. Dose reduction techniques were used.  CONTRAST: ISOVUE 370 90 mL     FINDINGS:   LOWER CHEST: Normal.     HEPATOBILIARY: Normal.     PANCREAS: Normal.     SPLEEN: Normal.     ADRENAL GLANDS: Normal.     KIDNEYS/BLADDER: Normal.     BOWEL: Mild wall thickening and mural hyperenhancement of the rectum. No upstream dilation, extraluminal gas, fluid collection.     LYMPH NODES: Normal.     VASCULATURE: Normal.     PELVIC ORGANS: Normal.     MUSCULOSKELETAL: Normal.                                                                   IMPRESSION:   Mild inflammation of the rectum suggesting infectious or inflammatory proctitis. No evidence of perianal abscess or fistula. Note that MRI is more sensitive for detecting fistulas    ASSESSMENT:    Rectal Pin   Proctitis  24 year old male with with no pertinent past medical history presents with rectal pain. CT noting Mild inflammation of the rectum suggesting infectious or inflammatory proctitis. No evidence of perianal abscess or fistula. Differential includes infectious etiology, STD, IBD.   -Normal CBC and LFTs.  -CRP 67.6  -ESR normal    PLAN:    1. NPO  2. Flex Sig today   3. Enema now, if tolerated  4. Stool studies  5. GI will continue to follow, please call with changes or questions.    Discussed with Dr. Schwartz who will also visit with the patient.     TIME  SPENT: 45 min including chart review, patient interview and care coordination.                                                Tracy Schwarz CNP  Thank you for the opportunity to participate in the care of this patient.   Please feel free to call me with any questions or concerns.  Phone number [phone].            Agree with above note and examination by Tracy Schwarz CNP  24 M with 3 days of malaise and subjective fevers along with mucous discharge of the anus. This can occur with leakage between bowel movements. No change in bowel activity, denies rectal bleeding  Denies family history of inflammatory bowel disease  Hx of chlamydia last year. Denies receptive anal intercourse.  CT with mild proctitis.  Physical exam reveals 24 M NAD, CHEST/ABD/PULM normal  Differential of infectious vs IBD vs anal fissure or hemorrhoids.   Plan Flex sig for assessment with biopsies    Approximately 24 minutes of total time was spent providing patient care, including patient evaluation, reviewing documentation/test result, and .       Sheldon Schwartz M.D.  [facility]  [phone]- business phone number (for scheduling)

## 2024-07-18 LAB
C TRACH DNA SPEC QL PROBE+SIG AMP: NEGATIVE
HIV 1+2 AB+HIV1 P24 AG SERPL QL IA: NONREACTIVE
HSV1 DNA SPEC QL NAA+PROBE: NOT DETECTED
HSV2 DNA SPEC QL NAA+PROBE: DETECTED
N GONORRHOEA DNA SPEC QL NAA+PROBE: NEGATIVE

## 2024-07-19 ENCOUNTER — HOSPITAL ENCOUNTER (OUTPATIENT)
Dept: CT IMAGING | Facility: HOSPITAL | Age: 24
Discharge: HOME | End: 2024-07-19
Attending: PHYSICIAN ASSISTANT | Admitting: PHYSICIAN ASSISTANT
Payer: COMMERCIAL

## 2024-07-19 ENCOUNTER — NURSE TRIAGE (OUTPATIENT)
Dept: FAMILY MEDICINE | Facility: CLINIC | Age: 24
End: 2024-07-19

## 2024-07-19 ENCOUNTER — OFFICE VISIT (OUTPATIENT)
Dept: PEDIATRICS | Facility: CLINIC | Age: 24
End: 2024-07-19
Payer: COMMERCIAL

## 2024-07-19 ENCOUNTER — MYC MEDICAL ADVICE (OUTPATIENT)
Dept: FAMILY MEDICINE | Facility: CLINIC | Age: 24
End: 2024-07-19
Payer: COMMERCIAL

## 2024-07-19 VITALS
TEMPERATURE: 98.5 F | DIASTOLIC BLOOD PRESSURE: 71 MMHG | RESPIRATION RATE: 16 BRPM | HEART RATE: 82 BPM | BODY MASS INDEX: 30.62 KG/M2 | WEIGHT: 184 LBS | SYSTOLIC BLOOD PRESSURE: 113 MMHG | OXYGEN SATURATION: 98 %

## 2024-07-19 DIAGNOSIS — K62.89 PROCTITIS: ICD-10-CM

## 2024-07-19 DIAGNOSIS — R51.9 ACUTE INTRACTABLE HEADACHE, UNSPECIFIED HEADACHE TYPE: ICD-10-CM

## 2024-07-19 DIAGNOSIS — G43.819 OTHER MIGRAINE WITHOUT STATUS MIGRAINOSUS, INTRACTABLE: Primary | ICD-10-CM

## 2024-07-19 PROCEDURE — 70450 CT HEAD/BRAIN W/O DYE: CPT

## 2024-07-19 PROCEDURE — 99215 OFFICE O/P EST HI 40 MIN: CPT | Mod: 25 | Performed by: PHYSICIAN ASSISTANT

## 2024-07-19 PROCEDURE — 96375 TX/PRO/DX INJ NEW DRUG ADDON: CPT | Performed by: PHYSICIAN ASSISTANT

## 2024-07-19 PROCEDURE — 96361 HYDRATE IV INFUSION ADD-ON: CPT | Performed by: PHYSICIAN ASSISTANT

## 2024-07-19 PROCEDURE — 96374 THER/PROPH/DIAG INJ IV PUSH: CPT | Performed by: PHYSICIAN ASSISTANT

## 2024-07-19 RX ORDER — KETOROLAC TROMETHAMINE 30 MG/ML
30 INJECTION, SOLUTION INTRAMUSCULAR; INTRAVENOUS ONCE
Status: COMPLETED | OUTPATIENT
Start: 2024-07-19 | End: 2024-07-19

## 2024-07-19 RX ORDER — ONDANSETRON 2 MG/ML
4 INJECTION INTRAMUSCULAR; INTRAVENOUS
Status: ACTIVE | OUTPATIENT
Start: 2024-07-19 | End: 2024-07-20

## 2024-07-19 RX ORDER — SUMATRIPTAN 50 MG/1
50 TABLET, FILM COATED ORAL
Qty: 12 TABLET | Refills: 0 | Status: SHIPPED | OUTPATIENT
Start: 2024-07-19

## 2024-07-19 RX ADMIN — ONDANSETRON 4 MG: 2 INJECTION INTRAMUSCULAR; INTRAVENOUS at 15:03

## 2024-07-19 RX ADMIN — KETOROLAC TROMETHAMINE 30 MG: 30 INJECTION, SOLUTION INTRAMUSCULAR; INTRAVENOUS at 15:02

## 2024-07-19 RX ADMIN — Medication 500 ML: at 15:07

## 2024-07-19 ASSESSMENT — PAIN SCALES - GENERAL: PAINLEVEL: SEVERE PAIN (6)

## 2024-07-19 NOTE — PROGRESS NOTES
Assessment/Plan:    CT head is normal. No meningeal signs. Unremarkable neuro exam. Fever/chills are improving.   Suspect migraine headache. Patient was given 500 mL of normal saline, 4 mg of Zofran, and 30 mg of Toradol IV. Notes significant improvement in symptoms, with severity of HA reducing from 9/10 to 2/10 in severity. Offered Compazine & Benadryl, he declines.   Will give Rx for Imitrex to have on hand in case he gets a severe headache again. Also recommend follow up with neurology since he has no prior diagnosis of migraine. Referral placed.     Continue antibiotics for proctitis and follow up with GI as planned; those symptoms are improving.     See patient instructions below.    At the end of the encounter, I discussed results, diagnosis, medications. Discussed red flags for immediate return to clinic/ER, as well as indications for follow up if no improvement. Patient understood and agreed to plan. Patient was stable for discharge.    48 minutes was spent preparing for the visit and reviewing the patient's chart; getting a history and performing an exam; counseling and providing education to the patient, family, or caregiver; ordering medicines and/or tests; communicating with other healthcare professionals; documenting information in the medical record; interpreting results and sharing that information with the patient, family, or caregiver; and care coordination.       ICD-10-CM    1. Other migraine without status migrainosus, intractable  G43.819 sodium chloride 0.9% BOLUS 500 mL     ondansetron (ZOFRAN) injection 4 mg     ketorolac (TORADOL) injection 30 mg     sodium chloride (PF) 0.9% PF flush 3 mL     Adult Neurology  Referral     SUMAtriptan (IMITREX) 50 MG tablet      2. Acute intractable headache, unspecified headache type  R51.9 CT Head w/o Contrast      3. Proctitis  K62.89             Return in about 2 weeks (around 8/2/2024) for follow up with neurology.    GLENROY George,  [facility]  -----------------------------------------------------------------------------------------------------------------------------------------------------    HPI:  Heath Quevedo is a 24 year old male who presents for evaluation of the following:    Headache  Onset/Duration:   5 days ago  Description:   Location: bilateral in the frontal area, going towards the occipital area  Character: throbbing pain  Frequency:  Constant  Duration:  Constant since onset  Wake with headaches:  YES- Patient woke up with headache everyday (but not waking him in the night)  Able to do daily activities when headache present:  no   Intensity: 9/10  Progression of Symptoms:  will temporarily improve with OTC medications, but not go away completely  Accompanying Signs & Symptoms:  Stiff neck: no   Neck or upper back pain: no            Sinus or URI symptoms: no   Fever: YES (earlier in the week, had unrelated issue, see below)  Nausea or vomiting: YES- Nausea, intermittent since yesterday  Dizziness: no   Numbness/tingling: no   Weakness: no   Visual changes: no   History:   Head trauma: no  Family history of migraines: YES- Mother, aunt, Grandpa and Grandma  Daily pain medication use: YES           Previous tests for headaches: no            Neurologist evaluations: no   Precipitating or alleviating factors:   Does light make it worse: YES- initially  Does sound make it worse: no   Does sleep help: YES  Therapies Tried and outcome:  ice pack, ibuprofen, Tylenol    Of note, patient was admitted to the hospital 7/16-7/17 with rectal pain, fever, & chills. He had flex sigmoidoscopy with biopsy done, and was diagnosed with acute proctitis. He was discharged home on Cipro & Flagyl, which he has been taking as directed. His rectal pain and fever/chills have improved. Plan is to follow up with GI.  Those symptoms initially began 5 days ago HA, myalgias, fever, and chills with rectal pain & mucus discharge  starting the following day.     While in the hospital, his headache was not addressed.     Past Medical History:   Diagnosis Date    Plantar warts        Vitals:    07/19/24 1331   BP: 113/71   BP Location: Right arm   Patient Position: Sitting   Cuff Size: Adult Large   Pulse: 82   Resp: 16   Temp: 98.5  F (36.9  C)   TempSrc: Oral   SpO2: 98%   Weight: 83.5 kg (184 lb)       Physical Exam  Vitals and nursing note reviewed.   Neck:      Meningeal: Brudzinski's sign and Kernig's sign absent.   Pulmonary:      Effort: Pulmonary effort is normal.   Musculoskeletal:      Cervical back: Neck supple. No rigidity.   Neurological:      Mental Status: He is alert.      GCS: GCS eye subscore is 4. GCS verbal subscore is 5. GCS motor subscore is 6.      Cranial Nerves: Cranial nerves 2-12 are intact.      Sensory: Sensation is intact.      Motor: Motor function is intact.      Coordination: Coordination is intact.      Gait: Gait is intact.         Labs/Imaging:  No results found for this or any previous visit (from the past 24 hour(s)).  Recent Results (from the past 24 hour(s))   CT Head w/o Contrast    Narrative    EXAM: CT HEAD W/O CONTRAST  LOCATION: [facility]  DATE: 7/19/2024    INDICATION: headache x 5 days, no hx of migraines. some nausea, photophobia  COMPARISON: None.  TECHNIQUE: Routine CT Head without IV contrast. Multiplanar reformats. Dose reduction techniques were used.    FINDINGS:  INTRACRANIAL CONTENTS: No intracranial hemorrhage, extraaxial collection, or mass effect.  No CT evidence of acute infarct. Normal parenchymal attenuation. Normal ventricles and sulci.     VISUALIZED ORBITS/SINUSES/MASTOIDS: No intraorbital abnormality. No paranasal sinus mucosal disease. No middle ear or mastoid effusion.    BONES/SOFT TISSUES: No skull fracture. No scalp hematoma.      Impression    IMPRESSION:  1.  Unremarkable head CT with no acute intracranial abnormality.           There are no  Patient Instructions on file for this visit.

## 2024-07-19 NOTE — TELEPHONE ENCOUNTER
Please see telephone encounter from today, 7/19/24.    Leda Johansen, SON, RN   [facility]

## 2024-07-19 NOTE — TELEPHONE ENCOUNTER
"[facility]:      RN called patient to further triage symptoms. Patient reported that he was recently hospitalized on 7/16/24 for fever, tachycardia, and rectal abscess that was diagnosed from urgent care. Patient was reporting abdominal pain and hip pain. Patient completed CT scan, which showed inflammation of rectum but no drainable abscess noted. CRP was elevated. They discussed with GI, who recommended patient start Cipo and Flagyl. Patient was transported to [place]. Was advised to follow up with PCP in 3-4 days.     Patient reported headache started on Sunday 7/14. This was noted at ER visit. Patients reports that they did not do much for his headache at hospital. Headaches come and go after taking medication. Will take PRN ibuprofen, Tylenol, and [place] Natural Migraine Relief (which patient stated was prescribed by a provider). Reports headache is located on top center of his head, feels like brain is \"pulsating\". Rates pain 9/10. Denies any recent injury to head. Has not been diagnosed with migraines previously.     Reports having a fever in hospital. Highest temperature noted was in hospital was 101.4 degrees. Does not own a thermometer at home. Has been feeling nauseous. Is trying to make himself vomit to try to make himself feel better. Has not actually vomited. Reports some pain behind eyes.     Reports feeling slightly dizzy with headaches, comes and goes. Reports drinking about 5 plastic water bottles per day, which would be about 80 ounces. Is urinating every couple hours. Urine is clear and lighter in color.     Reports rectal pain and abdominal pain is improving with antibiotics. Is taking antibiotics as prescribed.     Per protocol, advised patient to be seen today. RN offered patient to be seen at [facility] today, which patient is agreeable to. Would prefer to be seen in [place] as that is closer to home. Called over to [facility]. Gave report to Meeta Hannah, " "SCOTT, who agreed to see patient today. They will follow up with him to determine next steps. Patient is scheduled to be seen at [facility] today at 1:00 PM.    HANNAH Elkins, RN   [facility]    Reason for Disposition   SEVERE headache, sudden-onset (i.e., reaching maximum intensity within seconds to 1 hour)    Additional Information   Negative: Difficult to awaken or acting confused (e.g., disoriented, slurred speech)   Negative: Loss of speech or garbled speech and new-onset   Negative: Weakness of the face, arm or leg on one side of the body and new-onset   Negative: Numbness of the face, arm or leg on one side of the body and new-onset   Negative: Sounds like a life-threatening emergency to the triager   Negative: Passed out (i.e., fainted, collapsed and was not responding)   Negative: Stiff neck (can't touch chin to chest)   Negative: Unable to walk without falling   Negative: Possibility of carbon monoxide exposure   Negative: Loss of vision or double vision  (Exception: Same as prior migraines.)   Negative: Severe pain in one eye   Negative: Fever > 103 F (39.4 C)   Negative: Fever > 100.0 F (37.8 C) and has diabetes mellitus or a weak immune system (e.g., HIV positive, cancer chemotherapy, organ transplant, splenectomy, chronic steroids)    Answer Assessment - Initial Assessment Questions  1. LOCATION: \"Where does it hurt?\"       Top center. Feels like \"brain is pulsating\".   2. ONSET: \"When did the headache start?\" (Minutes, hours or days)       Yesterday morning/afternoon  3. PATTERN: \"Does the pain come and go, or has it been constant since it started?\"      When takes medication, will go away. Is taking ibuprofen, Tylenol, Migraine relief (two tablets under tongue, wait 15 minutes, take another 2 tablets)Took medication this morning around 5 AM, went back to bed, then woke up around 9:30 AM with headache.     Migraine medication: was prescribed. [employer] Natural " "Migraine Relief.     4. SEVERITY: \"How bad is the pain?\" and \"What does it keep you from doing?\"  (e.g., Scale 1-10; mild, moderate, or severe)    - MILD (1-3): doesn't interfere with normal activities     - MODERATE (4-7): interferes with normal activities or awakens from sleep     - SEVERE (8-10): excruciating pain, unable to do any normal activities         9/10  5. RECURRENT SYMPTOM: \"Have you ever had headaches before?\" If Yes, ask: \"When was the last time?\" and \"What happened that time?\"       Gets headaches every once in awhile, one of the worst he has had.   6. CAUSE: \"What do you think is causing the headache?\"      Unsure  7. MIGRAINE: \"Have you been diagnosed with migraine headaches?\" If Yes, ask: \"Is this headache similar?\"       No.   8. HEAD INJURY: \"Has there been any recent injury to the head?\"       No. Had workman's comp beginning of 2023. Was at work. Back of tail gate dropped on right side top of head. Had neck strain when working out in February. No recent injury.   9. OTHER SYMPTOMS: \"Do you have any other symptoms?\" (fever, stiff neck, eye pain, sore throat, cold symptoms)      Reports fever, last checked in hospital. Feels warm. Does not currently own thermometer. Reports feeling nauseous. Is trying to make himself gag due to feeling so nauseous, then makes headache worse for a short period of time. Not vomiting anything. Reports some pain behind eyes. No stiff neck.     Reports feeling slightly dizzy. Comes and goes. Dizziness is associated with headache.     Has been drinking fluids. Does not think he is drinking enough. Has about 5 water bottles. Is urinating every couple hours. Has been clear and lighter in color.     10. PREGNANCY: \"Is there any chance you are pregnant?\" \"When was your last menstrual period?\"        N/A    Protocols used: Headache-A-OH    "

## 2024-07-19 NOTE — PROGRESS NOTES
Acute and Diagnostic Services Clinic Visit    {PROVIDER CHARTING PREFERENCE:275812}    Kelle Joe is a 24 year old, presenting for the following health issues:  Headache  {(!) Visit Details have not yet been documented.  Please enter Visit Details and then use this list to pull in documentation. (Optional):812662}  HPI     Headache  Onset/Duration:   Sunday around the afternoon.  Description:   Location: bilateral in the frontal area, going towards the back   Character: throbbing pain, and uncomfortable  Frequency:  Constant, but wavelengths of pain rating  Duration:  Steady, constant  Wake with headaches:  YES- Patient woke up with headache everyday.  Able to do daily activities when headache present:  no   Intensity: 9/10  Progression of Symptoms:  improving, but also taking medication for it  Accompanying Signs & Symptoms:  Stiff neck: no   Neck or upper back pain: no            Sinus or URI symptoms: no   Fever: YES  Nausea or vomiting: YES- Nausea, comes and goes  Dizziness: no   Numbness/tingling: no   Weakness: no   Visual changes: no   History:   Head trauma: YES- Work Comp that happened at the end of 2022. [employer]550, tailgate dropped on patient's head. Neck strain earlier this year in February.  Family history of migraines: YES- Mother, Grandpa and Grandma  Daily pain medication use: YES- Not normally, but is taking antibiotics from in the hospital.           Previous tests for headaches: no            Neurologist evaluations: no   Precipitating or alleviating factors:   Does light make it worse: no - but when it was really bad it did  Does sound make it worse: no   Does sleep help: YES  Therapies Tried and outcome: Migraine and ice pack - ice pack helped.      {YURIY Borjas (Optional):859160}      Objective    /71 (BP Location: Right arm, Patient Position: Sitting, Cuff Size: Adult Large)   Pulse 82   Temp 98.5  F (36.9  C) (Oral)   Resp 16   Wt 83.5 kg (184 lb)   SpO2 98%   BMI 30.62 kg/m     Body mass index is 30.62 kg/m .  Physical Exam   {Exam List (Optional):496995}    {Diagnostic Test Results (Optional):816515}        Signed Electronically by: Meeta Hannah PA-C  {Email feedback regarding this note to [email or web address]   :[id number]}

## 2024-07-21 ENCOUNTER — HEALTH MAINTENANCE LETTER (OUTPATIENT)
Age: 24
End: 2024-07-21

## 2024-07-21 LAB
BACTERIA BLD CULT: NO GROWTH
BACTERIA BLD CULT: NO GROWTH

## 2024-07-22 LAB
PATH REPORT.COMMENTS IMP SPEC: NORMAL
PATH REPORT.FINAL DX SPEC: NORMAL
PATH REPORT.GROSS SPEC: NORMAL
PATH REPORT.MICROSCOPIC SPEC OTHER STN: NORMAL
PATH REPORT.RELEVANT HX SPEC: NORMAL
PHOTO IMAGE: NORMAL

## 2024-07-26 ENCOUNTER — OFFICE VISIT (OUTPATIENT)
Dept: FAMILY MEDICINE | Facility: CLINIC | Age: 24
End: 2024-07-26
Payer: COMMERCIAL

## 2024-07-26 VITALS
TEMPERATURE: 98.5 F | HEART RATE: 81 BPM | BODY MASS INDEX: 31.89 KG/M2 | WEIGHT: 186.8 LBS | HEIGHT: 64 IN | DIASTOLIC BLOOD PRESSURE: 80 MMHG | RESPIRATION RATE: 16 BRPM | SYSTOLIC BLOOD PRESSURE: 118 MMHG | OXYGEN SATURATION: 97 %

## 2024-07-26 DIAGNOSIS — B00.9 HSV-2 (HERPES SIMPLEX VIRUS 2) INFECTION: ICD-10-CM

## 2024-07-26 DIAGNOSIS — K62.89 ACUTE PROCTITIS: Primary | ICD-10-CM

## 2024-07-26 PROCEDURE — 99214 OFFICE O/P EST MOD 30 MIN: CPT

## 2024-07-26 PROCEDURE — G2211 COMPLEX E/M VISIT ADD ON: HCPCS

## 2024-07-26 RX ORDER — ACYCLOVIR 800 MG/1
TABLET ORAL
Qty: 30 TABLET | Refills: 0 | Status: SHIPPED | OUTPATIENT
Start: 2024-07-26

## 2024-07-26 RX ORDER — ACYCLOVIR 800 MG/1
800 TABLET ORAL
Qty: 35 TABLET | Refills: 0 | Status: CANCELLED | OUTPATIENT
Start: 2024-07-26 | End: 2024-08-02

## 2024-07-26 ASSESSMENT — PATIENT HEALTH QUESTIONNAIRE - PHQ9
SUM OF ALL RESPONSES TO PHQ QUESTIONS 1-9: 3
SUM OF ALL RESPONSES TO PHQ QUESTIONS 1-9: 3

## 2024-07-26 ASSESSMENT — PAIN SCALES - GENERAL: PAINLEVEL: NO PAIN (0)

## 2024-07-26 NOTE — PROGRESS NOTES
Assessment & Plan     Acute proctitis  HSV-2 (herpes simplex virus 2) infection  Patient hospitalized from 7/16/2024 through 7/17/2024.  Diagnosed with acute proctitis likely secondary to genital HSV 2 infection.  Patient not treated with an antiviral secondary to results coming more than 3 days after symptom onset.  Patient has had significant improvement in his symptoms.  I went ahead and prescribed him an antiviral in case he has repeat symptoms.  Patient is educated to take this medication as soon as symptom onset is noticed.  Patient educated should he have repeat proctitis symptoms and antiviral does not help relieve symptoms that he needs to be seen and reevaluated.  Red flag symptoms and patient is educated to also return for acute evaluation.  Patient expressed understanding and was in agreement with the plan of care today.  - acyclovir (ZOVIRAX) 800 MG tablet  Dispense: 30 tablet; Refill: 0      MED REC REQUIRED  Post Medication Reconciliation Status:  Discharge medications reconciled and changed, see notes/orders      Kelle Joe is a 24 year old, presenting for the following health issues:  Hospital F/U ([facility]-Sigmoidoscopy 07/16/24)        7/26/2024    12:47 PM   Additional Questions   Roomed by Marybeth AVILA LPN     [facility] Follow-up Visit:    Hospital/Nursing Home/ Rehab Facility: [facility]  Date of Admission: 7/16/24  Date of Discharge: 7/17/24  Reason(s) for Admission: rectal and back pain  Was the patient in the ICU or did the patient experience delirium during hospitalization?  No  Do you have any other stressors you would like to discuss with your provider? No    Problems taking medications regularly:  None  Medication changes since discharge: completed   Problems adhering to non-medication therapy:  None    Summary of hospitalization:  [facility] discharge summary reviewed  Diagnostic Tests/Treatments reviewed.   "Follow up needed: none  Other Healthcare Providers Involved in Patient s Care:         None  Update since discharge: improved.       Plan of care communicated with patient    Patient hospitalized on 7/16/2024 through 7/17/2024 for acute prostatitis which likely was secondary to HSV-2 infection.  Sigmoidoscopy biopsy showed active proctitis with erosion, CMV and treponema stains were negative.   Took metronidazole and ciprofloxacin to completion. Reports pain is significantly improved. Denies any hematuria, or hematochezia.  No abdominal pain.    Seen on 7/19/24 for migraine headache. CT head returned normal. Symptoms improved with zofran, normal saline, and IV toradol. Sumatriptan given for recurrent headaches. Referral placed to neurology and he has an appointment in October. He denies any headaches since discharge. He hasn't needed to use the sumatriptan at all.            Review of Systems  Constitutional, HEENT, cardiovascular, pulmonary, gi and gu systems are negative, except as otherwise noted.      Objective    /80 (BP Location: Left arm, Patient Position: Sitting, Cuff Size: Adult Regular)   Pulse 81   Temp 98.5  F (36.9  C) (Oral)   Resp 16   Ht 1.636 m (5' 4.4\")   Wt 84.7 kg (186 lb 12.8 oz)   SpO2 97%   BMI 31.67 kg/m    Body mass index is 31.67 kg/m .  Physical Exam   GENERAL: alert and no distress  EYES: Eyes grossly normal to inspection, conjunctivae and sclerae normal  NECK: no visualized asymmetry, masses, or scars  RESP: normal respiratory effort  SKIN: no suspicious lesions or rashes on visualized skin  PSYCH: mentation appears normal, affect normal/bright      Admission on 07/16/2024, Discharged on 07/17/2024   Component Date Value Ref Range Status    Sodium 07/17/2024 137  135 - 145 mmol/L Final    Potassium 07/17/2024 4.0  3.4 - 5.3 mmol/L Final    Carbon Dioxide (CO2) 07/17/2024 26  22 - 29 mmol/L Final    Anion Gap 07/17/2024 9  7 - 15 mmol/L Final    Urea Nitrogen 07/17/2024 8.3  " 6.0 - 20.0 mg/dL Final    Creatinine 07/17/2024 1.05  0.67 - 1.17 mg/dL Final    GFR Estimate 07/17/2024 >90  >60 mL/min/1.73m2 Final    eGFR calculated using 2021 CKD-EPI equation.    Calcium 07/17/2024 8.5 (L)  8.8 - 10.4 mg/dL Final    Reference intervals for this test were updated on 7/16/2024 to reflect our healthy population more accurately. There may be differences in the flagging of prior results with similar values performed with this method. Those prior results can be interpreted in the context of the updated reference intervals.    Chloride 07/17/2024 102  98 - 107 mmol/L Final    Glucose 07/17/2024 116 (H)  70 - 99 mg/dL Final    Alkaline Phosphatase 07/17/2024 63  40 - 150 U/L Final    AST 07/17/2024 26  0 - 45 U/L Final    ALT 07/17/2024 20  0 - 70 U/L Final    Protein Total 07/17/2024 6.7  6.4 - 8.3 g/dL Final    Albumin 07/17/2024 3.8  3.5 - 5.2 g/dL Final    Bilirubin Total 07/17/2024 0.4  <=1.2 mg/dL Final    CRP Inflammation 07/17/2024 67.60 (H)  <5.00 mg/L Final    WBC Count 07/17/2024 4.9  4.0 - 11.0 10e3/uL Final    RBC Count 07/17/2024 4.52  4.40 - 5.90 10e6/uL Final    Hemoglobin 07/17/2024 13.8  13.3 - 17.7 g/dL Final    Hematocrit 07/17/2024 40.6  40.0 - 53.0 % Final    MCV 07/17/2024 90  78 - 100 fL Final    MCH 07/17/2024 30.5  26.5 - 33.0 pg Final    MCHC 07/17/2024 34.0  31.5 - 36.5 g/dL Final    RDW 07/17/2024 13.0  10.0 - 15.0 % Final    Platelet Count 07/17/2024 176  150 - 450 10e3/uL Final    % Neutrophils 07/17/2024 56  % Final    % Lymphocytes 07/17/2024 23  % Final    % Monocytes 07/17/2024 13  % Final    % Eosinophils 07/17/2024 7  % Final    % Basophils 07/17/2024 0  % Final    % Immature Granulocytes 07/17/2024 1  % Final    NRBCs per 100 WBC 07/17/2024 0  <1 /100 Final    Absolute Neutrophils 07/17/2024 2.7  1.6 - 8.3 10e3/uL Final    Absolute Lymphocytes 07/17/2024 1.1  0.8 - 5.3 10e3/uL Final    Absolute Monocytes 07/17/2024 0.6  0.0 - 1.3 10e3/uL Final    Absolute  Eosinophils 07/17/2024 0.3  0.0 - 0.7 10e3/uL Final    Absolute Basophils 07/17/2024 0.0  0.0 - 0.2 10e3/uL Final    Absolute Immature Granulocytes 07/17/2024 0.0  <=0.4 10e3/uL Final    Absolute NRBCs 07/17/2024 0.0  10e3/uL Final    Campylobacter species 07/17/2024 Negative  Negative Final    Salmonella species 07/17/2024 Negative  Negative Final    Vibrio species 07/17/2024 Negative  Negative Final    Vibrio cholerae 07/17/2024 Negative  Negative Final    Yersinia enterocolitica 07/17/2024 Negative  Negative Final    Enteropathogenic E. coli (EPEC) 07/17/2024 Negative  Negative, NA Final    Shiga-like toxin-producing E. coli* 07/17/2024 Negative  Negative Final    Shigella/Enteroinvasive E. coli (E* 07/17/2024 Negative  Negative Final    Cryptosporidium species 07/17/2024 Negative  Negative Final    Giardia lamblia 07/17/2024 Negative  Negative Final    Norovirus Gl/Gll 07/17/2024 Negative  Negative Final    Rotavirus A 07/17/2024 Negative  Negative Final    Plesiomonas shigelloides 07/17/2024 Negative  Negative Final    Enteroaggregative E. coli (EAEC) 07/17/2024 Negative  Negative Final    Enterotoxigenic E. coli (ETEC) 07/17/2024 Negative  Negative Final    E. coli O157 07/17/2024 NA  Negative, NA Final    Cyclospora cayetanensis 07/17/2024 Negative  Negative Final    Entamoeba histolytica 07/17/2024 Negative  Negative Final    Adenovirus F40/41 07/17/2024 Negative  Negative Final    Astrovirus 07/17/2024 Negative  Negative Final    Sapovirus 07/17/2024 Negative  Negative Final    Case Report 07/17/2024    Final                    Value:Surgical Pathology Report                         Case: [id number]                                  Authorizing Provider:  Sheldon Schwartz MD      Collected:           07/17/2024 01:39 PM          Ordering Location:     [facility]      Received:            07/17/2024 02:28 PM                                 [facility]                                                                     Pathologist:           Lazaro Cam DO                                                            Specimen:    Rectum                                                                                     Final Diagnosis 07/17/2024    Final                    Value:This result contains rich text formatting which cannot be displayed here.    Comment 07/17/2024    Final                    Value:This result contains rich text formatting which cannot be displayed here.    Clinical Information 07/17/2024    Final                    Value:This result contains rich text formatting which cannot be displayed here.    Gross Description 07/17/2024    Final                    Value:This result contains rich text formatting which cannot be displayed here.    Microscopic Description 07/17/2024    Final                    Value:This result contains rich text formatting which cannot be displayed here.    Disclaimer 07/17/2024    Final                    Value:This result contains rich text formatting which cannot be displayed here.    Performing Labs 07/17/2024    Final                    Value:This result contains rich text formatting which cannot be displayed here.    Chlamydia Trachomatis 07/17/2024 Negative  Negative Final    Negative for C. trachomatis rRNA by transcription mediated amplification.   A negative result by transcription mediated amplification does not preclude the presence of infection because results are dependent on proper and adequate collection, absence of inhibitors and sufficient rRNA to be detected.    Neisseria gonorrhoeae 07/17/2024 Negative  Negative Final    Negative for N. gonorrhoeae rRNA by transcription mediated amplification. A negative result by transcription mediated amplification does not preclude the presence of C. trachomatis infection because results are dependent on proper and adequate collection, absence of inhibitors and sufficient rRNA to be detected.    Herpes Simplex  Virus 1 DNA 07/17/2024 Not Detected  Not Detected Final    Herpes simplex virus type 1 DNA not detected, presumed negative for HSV-1. A negative result does not rule out the presence of PCR inhibitors or HSV DNA in concentrations below the limit of detection.    Herpes Simplex Virus 2 DNA 07/17/2024 Detected (A)  Not Detected Final    HIV Antigen Antibody Combo 07/17/2024 Nonreactive  Nonreactive Final    Negative HIV-1 p24 antigen and HIV-1/2 antibody screening test results usually indicate the absence of HIV-1 and HIV-2 infection. However, such negative results do not rule-out acute HIV infection.  If acute HIV-1 or HIV-2 infection is suspected, detection of HIV-1 or HIV-2 RNA  is recommended.     Flex Sig 07/17/2024    Corrected                    Value:[facility]  [address] [place] [address]  _______________________________________________________________________________  Patient Name: Heath Quevedo          Procedure Date: 7/17/2024 12:36 PM  MRN: [id number]                       Account Number: [id number]  YOB: 2000              Admit Type: Inpatient  Age: 24                               Room: [place] [address]  Note Status: Supervisor Override      Attending MD: JORDY CARRERO MD,   Instrument Name: Pediatric Colonoscope 6432   _______________________________________________________________________________     Procedure:             Flexible Sigmoidoscopy  Indications:           Abnormal CT of the GI tract  Providers:             JORDY CARRERO MD  Referring MD:          ERICK MCCLURE MD  Medicines:             Midazolam 1 mg IV, Fentanyl 50 micrograms IV  Complications:         No immediate complications.  ___________________________________________________________________________                          ____  Procedure:             Pre-Anesthesia Assessment:                         - Prior to the procedure, a History and Physical was                           performed, and patient medications and allergies were                          reviewed. The patient is competent. The risks and                          benefits of the procedure and the sedation options and                          risks were discussed with the patient. All questions                          were answered and informed consent was obtained.                          Patient identification and proposed procedure were                          verified by the physician in the procedure room.                          Prophylactic Antibiotics: The patient does not require                          prophylactic antibiotics. Prior Anticoagulants: The                          patient has taken no anticoagulant or antiplatelet                          agents. ASA Grade Assessment: II - A patient with mild                                                    systemic disease. After reviewing the risks and                          benefits, the patient was deemed in satisfactory                          condition to undergo the procedure. The anesthesia                          plan was to use moderate sedation / analgesia                          (conscious sedation). Immediately prior to                          administration of medications, the patient was                          re-assessed for adequacy to receive sedatives. The                          heart rate, respiratory rate, oxygen saturations,                          blood pressure, adequacy of pulmonary ventilation, and                          response to care were monitored throughout the                          procedure. The physical status of the patient was                          re-assessed after the procedure.                         After obtaining informed consent, the scope was passed                          under direct vision. The pediatric colonosco                          pe was                          introduced through the  anus and advanced to the                          sigmoid colon. The flexible sigmoidoscopy was                          accomplished without difficulty. The patient tolerated                          the procedure well. The quality of the bowel                          preparation was excellent.                                                                                   Findings:       The digital rectal exam was normal though patient noted pain with        insertion. Lidocaine jelly used for relief       Inflammatory change with punched out ulcerations present in the rectum.        This is sporadic in the proximal rectum and increases distally to become        confluent in the very distal 3-5 cm of the rectum adjacent to the anal        verge.       Biopsies were taken with a cold forceps for histology.                                                                                   Moderate Sedation:       Moderate (consc                          ious) sedation was administered by the nurse and        supervised by the endoscopist. The patient's oxygen saturation, heart        rate, blood pressure and response to care were monitored.  Impression:            - Mucosal ulceration. Biopsied. Suspect infectious                          cause as opposed to IBD.                         Will also send syphillis labs and GC/Chlamydia/herpes                          swabs  Recommendation:        - Await pathology results.                                                                                       ____________________  JORDY CARRERO MD  7/17/2024 1:59:31 PM  I was physically present for the entire viewing portion of the exam.  __________________________  Signature of teaching physician  JEFFc/Geovanny CARRERO MD  Number of Addenda: 0    Note Initiated On: 7/17/2024 12:36 PM  Scope In: 1:37:10 PM  Scope Out: 1:45:13 PM             Signed Electronically by: Cherelle Woodward PA-C    Answers submitted by the  patient for this visit:  Patient Health Questionnaire (Submitted on 7/26/2024)  PHQ9 TOTAL SCORE: 3

## 2025-08-10 ENCOUNTER — HEALTH MAINTENANCE LETTER (OUTPATIENT)
Age: 25
End: 2025-08-10

## (undated) DEVICE — KIT CONNECTOR FOR OLYMPUS ENDOSCOPES DEFENDO 100310

## (undated) DEVICE — FORCEP BIOPSY DISP 000386

## (undated) DEVICE — SYR 03ML LL W/O NDL 309657

## (undated) DEVICE — TUBING ENDOGATOR + H2O PORT CO

## (undated) DEVICE — TUBING SUCTION MEDI-VAC 1/4"X20' N620A

## (undated) DEVICE — UNDERPAD 30X30 BULK 949B10

## (undated) DEVICE — SOL WATER IRRIG 500ML BOTTLE 2F7113

## (undated) DEVICE — SUCTION CANISTER 1000ML 65651-510

## (undated) DEVICE — KIT SCOPE CLEANING ENDOSCOPY BX00719705